# Patient Record
Sex: FEMALE | Race: OTHER | HISPANIC OR LATINO | URBAN - METROPOLITAN AREA
[De-identification: names, ages, dates, MRNs, and addresses within clinical notes are randomized per-mention and may not be internally consistent; named-entity substitution may affect disease eponyms.]

---

## 2020-09-28 ENCOUNTER — EMERGENCY (EMERGENCY)
Facility: HOSPITAL | Age: 30
LOS: 1 days | End: 2020-09-28
Admitting: EMERGENCY MEDICINE
Payer: MEDICAID

## 2020-09-28 PROCEDURE — 76815 OB US LIMITED FETUS(S): CPT | Mod: 26

## 2020-09-28 PROCEDURE — 76817 TRANSVAGINAL US OBSTETRIC: CPT | Mod: 26

## 2020-09-28 PROCEDURE — 99285 EMERGENCY DEPT VISIT HI MDM: CPT

## 2020-10-03 ENCOUNTER — EMERGENCY (EMERGENCY)
Facility: HOSPITAL | Age: 30
LOS: 1 days | End: 2020-10-03
Payer: MEDICAID

## 2020-10-03 PROCEDURE — 76815 OB US LIMITED FETUS(S): CPT | Mod: 26

## 2020-10-03 PROCEDURE — 76817 TRANSVAGINAL US OBSTETRIC: CPT | Mod: 26

## 2020-10-03 PROCEDURE — 99284 EMERGENCY DEPT VISIT MOD MDM: CPT

## 2020-11-05 ENCOUNTER — APPOINTMENT (OUTPATIENT)
Dept: MATERNAL FETAL MEDICINE | Facility: CLINIC | Age: 30
End: 2020-11-05
Payer: MEDICAID

## 2020-11-05 PROCEDURE — 99203 OFFICE O/P NEW LOW 30 MIN: CPT | Mod: TH

## 2020-11-05 PROCEDURE — 76811 OB US DETAILED SNGL FETUS: CPT

## 2020-11-09 PROBLEM — Z00.00 ENCOUNTER FOR PREVENTIVE HEALTH EXAMINATION: Status: ACTIVE | Noted: 2020-11-09

## 2021-01-21 ENCOUNTER — APPOINTMENT (OUTPATIENT)
Dept: ANTEPARTUM | Facility: CLINIC | Age: 31
End: 2021-01-21
Payer: MEDICAID

## 2021-01-21 ENCOUNTER — ASOB RESULT (OUTPATIENT)
Age: 31
End: 2021-01-21

## 2021-01-21 PROCEDURE — 76811 OB US DETAILED SNGL FETUS: CPT

## 2021-01-21 PROCEDURE — 99072 ADDL SUPL MATRL&STAF TM PHE: CPT

## 2021-03-11 ENCOUNTER — ASOB RESULT (OUTPATIENT)
Age: 31
End: 2021-03-11

## 2021-03-11 ENCOUNTER — APPOINTMENT (OUTPATIENT)
Dept: ANTEPARTUM | Facility: CLINIC | Age: 31
End: 2021-03-11
Payer: MEDICAID

## 2021-03-11 PROCEDURE — 76816 OB US FOLLOW-UP PER FETUS: CPT

## 2021-03-11 PROCEDURE — 99072 ADDL SUPL MATRL&STAF TM PHE: CPT

## 2021-04-07 ENCOUNTER — ASOB RESULT (OUTPATIENT)
Age: 31
End: 2021-04-07

## 2021-04-07 ENCOUNTER — APPOINTMENT (OUTPATIENT)
Dept: MATERNAL FETAL MEDICINE | Facility: CLINIC | Age: 31
End: 2021-04-07
Payer: MEDICAID

## 2021-04-07 VITALS — WEIGHT: 162 LBS

## 2021-04-07 DIAGNOSIS — Z86.32 PERSONAL HISTORY OF GESTATIONAL DIABETES: ICD-10-CM

## 2021-04-07 DIAGNOSIS — Z87.59 PERSONAL HISTORY OF OTHER COMPLICATIONS OF PREGNANCY, CHILDBIRTH AND THE PUERPERIUM: ICD-10-CM

## 2021-04-07 DIAGNOSIS — Z83.3 FAMILY HISTORY OF DIABETES MELLITUS: ICD-10-CM

## 2021-04-07 DIAGNOSIS — Z87.51 PERSONAL HISTORY OF PRE-TERM LABOR: ICD-10-CM

## 2021-04-07 DIAGNOSIS — O24.415 GESTATIONAL DIABETES MELLITUS IN PREGNANCY, CONTROLLED BY ORAL HYPOGLYCEMIC DRUGS: ICD-10-CM

## 2021-04-07 PROCEDURE — G0108 DIAB MANAGE TRN  PER INDIV: CPT | Mod: 95

## 2021-04-07 RX ORDER — METFORMIN HYDROCHLORIDE 500 MG/1
500 TABLET, COATED ORAL
Qty: 1 | Refills: 2 | Status: ACTIVE | COMMUNITY
Start: 2021-04-07 | End: 1900-01-01

## 2021-04-20 ENCOUNTER — INPATIENT (INPATIENT)
Facility: HOSPITAL | Age: 31
LOS: 0 days | Discharge: ANOTHER HOSPITAL | End: 2021-04-20
Attending: OBSTETRICS & GYNECOLOGY | Admitting: OBSTETRICS & GYNECOLOGY
Payer: MEDICAID

## 2021-04-20 ENCOUNTER — INPATIENT (INPATIENT)
Facility: HOSPITAL | Age: 31
LOS: 5 days | Discharge: HOME | End: 2021-04-26
Attending: OBSTETRICS & GYNECOLOGY | Admitting: OBSTETRICS & GYNECOLOGY
Payer: COMMERCIAL

## 2021-04-20 VITALS — DIASTOLIC BLOOD PRESSURE: 71 MMHG | SYSTOLIC BLOOD PRESSURE: 145 MMHG | HEART RATE: 109 BPM

## 2021-04-20 DIAGNOSIS — O26.893 OTHER SPECIFIED PREGNANCY RELATED CONDITIONS, THIRD TRIMESTER: ICD-10-CM

## 2021-04-20 LAB
ABO RH CONFIRMATION: SIGNIFICANT CHANGE UP
ALBUMIN SERPL ELPH-MCNC: 3.2 G/DL — LOW (ref 3.3–5.2)
ALBUMIN SERPL ELPH-MCNC: 3.2 G/DL — LOW (ref 3.3–5.2)
ALP SERPL-CCNC: 139 U/L — HIGH (ref 40–120)
ALP SERPL-CCNC: 142 U/L — HIGH (ref 40–120)
ALT FLD-CCNC: 12 U/L — SIGNIFICANT CHANGE UP
ALT FLD-CCNC: 13 U/L — SIGNIFICANT CHANGE UP
ANION GAP SERPL CALC-SCNC: 18 MMOL/L — HIGH (ref 5–17)
ANION GAP SERPL CALC-SCNC: 20 MMOL/L — HIGH (ref 5–17)
APPEARANCE UR: CLEAR — SIGNIFICANT CHANGE UP
APTT BLD: 28.4 SEC — SIGNIFICANT CHANGE UP (ref 27.5–35.5)
AST SERPL-CCNC: 20 U/L — SIGNIFICANT CHANGE UP
AST SERPL-CCNC: 20 U/L — SIGNIFICANT CHANGE UP
BACTERIA # UR AUTO: ABNORMAL
BASOPHILS # BLD AUTO: 0.03 K/UL — SIGNIFICANT CHANGE UP (ref 0–0.2)
BASOPHILS NFR BLD AUTO: 0.2 % — SIGNIFICANT CHANGE UP (ref 0–2)
BILIRUB SERPL-MCNC: 0.2 MG/DL — LOW (ref 0.4–2)
BILIRUB SERPL-MCNC: 0.2 MG/DL — LOW (ref 0.4–2)
BILIRUB UR-MCNC: NEGATIVE — SIGNIFICANT CHANGE UP
BLD GP AB SCN SERPL QL: SIGNIFICANT CHANGE UP
BUN SERPL-MCNC: 8 MG/DL — SIGNIFICANT CHANGE UP (ref 8–20)
BUN SERPL-MCNC: 9 MG/DL — SIGNIFICANT CHANGE UP (ref 8–20)
CALCIUM SERPL-MCNC: 7.9 MG/DL — LOW (ref 8.6–10.2)
CALCIUM SERPL-MCNC: 8 MG/DL — LOW (ref 8.6–10.2)
CHLORIDE SERPL-SCNC: 98 MMOL/L — SIGNIFICANT CHANGE UP (ref 98–107)
CHLORIDE SERPL-SCNC: 98 MMOL/L — SIGNIFICANT CHANGE UP (ref 98–107)
CO2 SERPL-SCNC: 14 MMOL/L — LOW (ref 22–29)
CO2 SERPL-SCNC: 15 MMOL/L — LOW (ref 22–29)
COLOR SPEC: YELLOW — SIGNIFICANT CHANGE UP
CREAT ?TM UR-MCNC: 47 MG/DL — SIGNIFICANT CHANGE UP
CREAT SERPL-MCNC: 0.45 MG/DL — LOW (ref 0.5–1.3)
CREAT SERPL-MCNC: 0.49 MG/DL — LOW (ref 0.5–1.3)
DIFF PNL FLD: ABNORMAL
EOSINOPHIL # BLD AUTO: 0.01 K/UL — SIGNIFICANT CHANGE UP (ref 0–0.5)
EOSINOPHIL NFR BLD AUTO: 0.1 % — SIGNIFICANT CHANGE UP (ref 0–6)
EPI CELLS # UR: SIGNIFICANT CHANGE UP
FIBRINOGEN PPP-MCNC: 1012 MG/DL — CRITICAL HIGH (ref 290–520)
FIBRONECTIN FETAL SPEC QL: POSITIVE
GLUCOSE BLDC GLUCOMTR-MCNC: 103 MG/DL — HIGH (ref 70–99)
GLUCOSE SERPL-MCNC: 107 MG/DL — HIGH (ref 70–99)
GLUCOSE SERPL-MCNC: 119 MG/DL — HIGH (ref 70–99)
GLUCOSE UR QL: NEGATIVE MG/DL — SIGNIFICANT CHANGE UP
HCT VFR BLD CALC: 39 % — SIGNIFICANT CHANGE UP (ref 34.5–45)
HCT VFR BLD CALC: 39.9 % — SIGNIFICANT CHANGE UP (ref 34.5–45)
HGB BLD-MCNC: 13.1 G/DL — SIGNIFICANT CHANGE UP (ref 11.5–15.5)
HGB BLD-MCNC: 13.5 G/DL — SIGNIFICANT CHANGE UP (ref 11.5–15.5)
HIV 1 & 2 AB SERPL IA.RAPID: SIGNIFICANT CHANGE UP
IMM GRANULOCYTES NFR BLD AUTO: 1.8 % — HIGH (ref 0–1.5)
INR BLD: 0.95 RATIO — SIGNIFICANT CHANGE UP (ref 0.88–1.16)
KETONES UR-MCNC: ABNORMAL
LDH SERPL L TO P-CCNC: 213 U/L — HIGH (ref 98–192)
LEUKOCYTE ESTERASE UR-ACNC: NEGATIVE — SIGNIFICANT CHANGE UP
LYMPHOCYTES # BLD AUTO: 1.03 K/UL — SIGNIFICANT CHANGE UP (ref 1–3.3)
LYMPHOCYTES # BLD AUTO: 8 % — LOW (ref 13–44)
MAGNESIUM SERPL-MCNC: 4.8 MG/DL — HIGH (ref 1.6–2.6)
MAGNESIUM SERPL-MCNC: 5 MG/DL — HIGH (ref 1.6–2.6)
MCHC RBC-ENTMCNC: 30.1 PG — SIGNIFICANT CHANGE UP (ref 27–34)
MCHC RBC-ENTMCNC: 30.1 PG — SIGNIFICANT CHANGE UP (ref 27–34)
MCHC RBC-ENTMCNC: 33.6 GM/DL — SIGNIFICANT CHANGE UP (ref 32–36)
MCHC RBC-ENTMCNC: 33.8 GM/DL — SIGNIFICANT CHANGE UP (ref 32–36)
MCV RBC AUTO: 89.1 FL — SIGNIFICANT CHANGE UP (ref 80–100)
MCV RBC AUTO: 89.7 FL — SIGNIFICANT CHANGE UP (ref 80–100)
MONOCYTES # BLD AUTO: 0.12 K/UL — SIGNIFICANT CHANGE UP (ref 0–0.9)
MONOCYTES NFR BLD AUTO: 0.9 % — LOW (ref 2–14)
NEUTROPHILS # BLD AUTO: 11.46 K/UL — HIGH (ref 1.8–7.4)
NEUTROPHILS NFR BLD AUTO: 89 % — HIGH (ref 43–77)
NITRITE UR-MCNC: NEGATIVE — SIGNIFICANT CHANGE UP
PH UR: 6 — SIGNIFICANT CHANGE UP (ref 5–8)
PLATELET # BLD AUTO: 237 K/UL — SIGNIFICANT CHANGE UP (ref 150–400)
PLATELET # BLD AUTO: 244 K/UL — SIGNIFICANT CHANGE UP (ref 150–400)
POTASSIUM SERPL-MCNC: 4.2 MMOL/L — SIGNIFICANT CHANGE UP (ref 3.5–5.3)
POTASSIUM SERPL-MCNC: 4.4 MMOL/L — SIGNIFICANT CHANGE UP (ref 3.5–5.3)
POTASSIUM SERPL-SCNC: 4.2 MMOL/L — SIGNIFICANT CHANGE UP (ref 3.5–5.3)
POTASSIUM SERPL-SCNC: 4.4 MMOL/L — SIGNIFICANT CHANGE UP (ref 3.5–5.3)
PROT ?TM UR-MCNC: 15 MG/DL — HIGH (ref 0–12)
PROT SERPL-MCNC: 6.7 G/DL — SIGNIFICANT CHANGE UP (ref 6.6–8.7)
PROT SERPL-MCNC: 6.8 G/DL — SIGNIFICANT CHANGE UP (ref 6.6–8.7)
PROT UR-MCNC: NEGATIVE MG/DL — SIGNIFICANT CHANGE UP
PROT/CREAT UR-RTO: 0.3 RATIO — HIGH
PROTHROM AB SERPL-ACNC: 11 SEC — SIGNIFICANT CHANGE UP (ref 10.6–13.6)
RBC # BLD: 4.35 M/UL — SIGNIFICANT CHANGE UP (ref 3.8–5.2)
RBC # BLD: 4.48 M/UL — SIGNIFICANT CHANGE UP (ref 3.8–5.2)
RBC # FLD: 13 % — SIGNIFICANT CHANGE UP (ref 10.3–14.5)
RBC # FLD: 13.2 % — SIGNIFICANT CHANGE UP (ref 10.3–14.5)
RBC CASTS # UR COMP ASSIST: ABNORMAL /HPF (ref 0–4)
SODIUM SERPL-SCNC: 131 MMOL/L — LOW (ref 135–145)
SODIUM SERPL-SCNC: 132 MMOL/L — LOW (ref 135–145)
SP GR SPEC: 1.02 — SIGNIFICANT CHANGE UP (ref 1.01–1.02)
URATE SERPL-MCNC: 6.4 MG/DL — HIGH (ref 2.4–5.7)
UROBILINOGEN FLD QL: NEGATIVE MG/DL — SIGNIFICANT CHANGE UP
WBC # BLD: 12.74 K/UL — HIGH (ref 3.8–10.5)
WBC # BLD: 12.88 K/UL — HIGH (ref 3.8–10.5)
WBC # FLD AUTO: 12.74 K/UL — HIGH (ref 3.8–10.5)
WBC # FLD AUTO: 12.88 K/UL — HIGH (ref 3.8–10.5)
WBC UR QL: SIGNIFICANT CHANGE UP

## 2021-04-20 PROCEDURE — 76820 UMBILICAL ARTERY ECHO: CPT | Mod: 26

## 2021-04-20 PROCEDURE — L9996: CPT

## 2021-04-20 PROCEDURE — 76815 OB US LIMITED FETUS(S): CPT | Mod: 26

## 2021-04-20 PROCEDURE — 70551 MRI BRAIN STEM W/O DYE: CPT | Mod: 26

## 2021-04-20 PROCEDURE — 76819 FETAL BIOPHYS PROFIL W/O NST: CPT | Mod: 26

## 2021-04-20 PROCEDURE — 99213 OFFICE O/P EST LOW 20 MIN: CPT

## 2021-04-20 PROCEDURE — 99221 1ST HOSP IP/OBS SF/LOW 40: CPT

## 2021-04-20 PROCEDURE — 76816 OB US FOLLOW-UP PER FETUS: CPT | Mod: 26

## 2021-04-20 PROCEDURE — 70544 MR ANGIOGRAPHY HEAD W/O DYE: CPT | Mod: 26,59

## 2021-04-20 PROCEDURE — 76818 FETAL BIOPHYS PROFILE W/NST: CPT | Mod: 26

## 2021-04-20 PROCEDURE — 93976 VASCULAR STUDY: CPT | Mod: 26

## 2021-04-20 RX ORDER — FOLIC ACID 0.8 MG
1 TABLET ORAL DAILY
Refills: 0 | Status: DISCONTINUED | OUTPATIENT
Start: 2021-04-20 | End: 2021-04-22

## 2021-04-20 RX ORDER — MAGNESIUM SULFATE 500 MG/ML
2 VIAL (ML) INJECTION
Qty: 40 | Refills: 0 | Status: DISCONTINUED | OUTPATIENT
Start: 2021-04-20 | End: 2021-04-26

## 2021-04-20 RX ORDER — PROCHLORPERAZINE MALEATE 5 MG
10 TABLET ORAL ONCE
Refills: 0 | Status: COMPLETED | OUTPATIENT
Start: 2021-04-20 | End: 2021-04-20

## 2021-04-20 RX ORDER — ACETAMINOPHEN 500 MG
1000 TABLET ORAL ONCE
Refills: 0 | Status: COMPLETED | OUTPATIENT
Start: 2021-04-20 | End: 2021-04-20

## 2021-04-20 RX ORDER — DEXTROSE 50 % IN WATER 50 %
15 SYRINGE (ML) INTRAVENOUS ONCE
Refills: 0 | Status: DISCONTINUED | OUTPATIENT
Start: 2021-04-20 | End: 2021-04-23

## 2021-04-20 RX ORDER — INSULIN LISPRO 100/ML
VIAL (ML) SUBCUTANEOUS
Refills: 0 | Status: DISCONTINUED | OUTPATIENT
Start: 2021-04-20 | End: 2021-04-21

## 2021-04-20 RX ORDER — GLUCAGON INJECTION, SOLUTION 0.5 MG/.1ML
1 INJECTION, SOLUTION SUBCUTANEOUS ONCE
Refills: 0 | Status: DISCONTINUED | OUTPATIENT
Start: 2021-04-20 | End: 2021-04-23

## 2021-04-20 RX ORDER — DIPHENHYDRAMINE HCL 50 MG
50 CAPSULE ORAL ONCE
Refills: 0 | Status: COMPLETED | OUTPATIENT
Start: 2021-04-20 | End: 2021-04-20

## 2021-04-20 RX ORDER — SODIUM CHLORIDE 9 MG/ML
1000 INJECTION, SOLUTION INTRAVENOUS
Refills: 0 | Status: DISCONTINUED | OUTPATIENT
Start: 2021-04-20 | End: 2021-04-23

## 2021-04-20 RX ORDER — SODIUM CHLORIDE 9 MG/ML
1000 INJECTION, SOLUTION INTRAVENOUS
Refills: 0 | Status: DISCONTINUED | OUTPATIENT
Start: 2021-04-20 | End: 2021-04-21

## 2021-04-20 RX ORDER — DEXTROSE 50 % IN WATER 50 %
25 SYRINGE (ML) INTRAVENOUS ONCE
Refills: 0 | Status: DISCONTINUED | OUTPATIENT
Start: 2021-04-20 | End: 2021-04-23

## 2021-04-20 RX ORDER — DEXTROSE 50 % IN WATER 50 %
12.5 SYRINGE (ML) INTRAVENOUS ONCE
Refills: 0 | Status: DISCONTINUED | OUTPATIENT
Start: 2021-04-20 | End: 2021-04-23

## 2021-04-20 RX ORDER — METOCLOPRAMIDE HCL 10 MG
10 TABLET ORAL ONCE
Refills: 0 | Status: COMPLETED | OUTPATIENT
Start: 2021-04-20 | End: 2021-04-20

## 2021-04-20 RX ORDER — SODIUM CHLORIDE 9 MG/ML
1000 INJECTION, SOLUTION INTRAVENOUS
Refills: 0 | Status: COMPLETED | OUTPATIENT
Start: 2021-04-20 | End: 2021-04-20

## 2021-04-20 RX ADMIN — Medication 400 MILLIGRAM(S): at 15:55

## 2021-04-20 RX ADMIN — Medication 10 MILLIGRAM(S): at 20:42

## 2021-04-20 RX ADMIN — SODIUM CHLORIDE 75 MILLILITER(S): 9 INJECTION, SOLUTION INTRAVENOUS at 20:41

## 2021-04-20 RX ADMIN — Medication 1000 MILLIGRAM(S): at 16:10

## 2021-04-20 RX ADMIN — Medication 50 MILLIGRAM(S): at 20:49

## 2021-04-20 RX ADMIN — Medication 50 GM/HR: at 15:44

## 2021-04-20 RX ADMIN — SODIUM CHLORIDE 75 MILLILITER(S): 9 INJECTION, SOLUTION INTRAVENOUS at 15:44

## 2021-04-20 NOTE — OB RN PATIENT PROFILE - NS_OBGYNHISTORY_OBGYN_ALL_OB_FT
as above    history of blood transfusion with first delivery - patient experienced fever.   GDMA2 on metformin

## 2021-04-20 NOTE — CONSULT NOTE ADULT - ATTENDING COMMENTS
MFM - [place] and [place] [facility] [place]  IUP at 33.1 weeks with history of indicated   delivery x 2 complicated by preeclampsia presents for inpatient management of preeclampsia with severe features.  Pregnancy also complicated by A2GDM on Metformin. Patient reports onset on headache yesterday and [place] [facility] [place] BP treated with IV labetalol 20 / 40.  Magnesium sulfate therapy started and patient received beta#1.  FHRT reassuring and patient stable for transfer to higher level of NICU care.  Upon arrival, patient continues to report severe headache note relieved by Tylenol.  She also reports generalized paraesthesia and left sided facial pain.  BP stable.  No significant findings on neurologic exam.  Repeat labs pending.  FHRT Category I with ctx q 2-5 minutes.  Continue MGSO4 and trend BP.  NPO with accuchecks q4 and ISS.  GIven significant neuro findings and headache, will obtain MRI/MRV/MRA to rule out intracranial pathology.  IV Tylenol and Fioricet for headache.  Plan for inpatient admission until delivery at 34 weeks.  Earlier delivery may be necessary if no improvement in headache, persistent severe range BP or change in maternal / fetal status.  Additionally, if no resolution of contractions or evidence of labor, will proceed with  delivery given history of prior uterine surgery.  Patient counseled regarding plan and consented for MRI/MRV/MRA.   Sade Batista MD  Maternal Fetal Medicine

## 2021-04-20 NOTE — OB PROVIDER H&P - NSHPPHYSICALEXAM_GEN_ALL_CORE
Vital Signs Last 24 Hrs  T(C): 36.8 (20 Apr 2021 16:55), Max: 36.8 (20 Apr 2021 16:55)  T(F): 98.24 (20 Apr 2021 16:55), Max: 98.24 (20 Apr 2021 16:55)  HR: 113 (20 Apr 2021 17:00) (108 - 115)  BP: 136/76 (20 Apr 2021 16:55) (130/73 - 152/80)  RR: 36 (20 Apr 2021 16:55) (36 - 36)  SpO2: 96% (20 Apr 2021 17:00) (94% - 96%)    Gen: NAD, alert and oriented x3   Neuro: pain with ROM of eyes to left side, otherwise follows finger with smooth motion and no nystagmus, PERRLA, symmetry noted with smiling, sticking out tongue, and raising eyebrows, adequate and symmetric muscular resistance to forced opening of eyes  Cardio: RRR  Lungs: CTAB   Abdomen: gravid, nontender to palpation  Ext: nontender lower extremities bilaterally, +2 brachioradialis DTR's bilaterally, +clonus noted on pedal flexion, moderate edema in lower extremities     FHR: baseline 130, moderate variability, +10x10 accels, no decels  [place]: al irregulalry q2-5m apart

## 2021-04-20 NOTE — OB PROVIDER H&P - ASSESSMENT
31 y/o  at 33w1d (EDC 21) admitted for pre-eclampsia with severe features based on elevated blood pressures requiring IV antihypertensive medication administration x1 and neurological symptoms.     1. Pre-eclampsia with severe features   - PIH labs drawn for baseline here   - MgSO4 started at 10am, will continue with Mg serum level checks for therapeutic goal and Mg toxicity checks   - BP's well controlled at this time, will continue to monitor and medicate as needed, no standing medications at this time   - Beta1 at 11am, Beta2 pending for tomorrow   - Neuro symptoms consistent with headache and visual disturbances, work up and intervention below     2. Neurological Deficits   - face and extremity neuro exam without major abnormalities, tone/ROM/fine movements intact, ocular exam without gross deficits except pain with left ROM   - 10/10 headache, given Tylenol and Fioricet, will re-evaluate for improvement   - MRI/MRA/MRV Head pending   - paresthesias of left half of face and fingertips/toes, will control BP and serum glucose, will follow neuro evaluation   - will discuss neurology consult pending evaluation results     3. GDM   - formerly on Metformin 500 BID, will discontinue and start on ISS   - FSG 7xdaily   - HgbA1C elevated in outpatient setting   - glucose control + intervention as needed     4. 33w gestation   - risk of  delivery, currently getting ACS, next dose tomorrow 11am   - continuous FHT/[place] monitoring, will observe for resolution of  contractions   - [place] [facility] [place] , EFW 2019g, vertex presentation, grossly normal placental evaluation,  BPP, FARHAN 10cm+  - Will discuss FFN + CL if  contractions persist   - GBS to be collected, as well as 3rd trimester labs, COVID outpatient NEG and asymptomatic     d/w Dr. Morris ([facility] OB Hospital[facility] On-Call)   d/w Dr. Batista ([facility] On-Call)

## 2021-04-20 NOTE — CONSULT NOTE ADULT - SUBJECTIVE AND OBJECTIVE BOX
31 y/o  at 33w1d (EDC 21), by first trimester sono) transfer from [facility] [place] for pre-eclampsia with severe features.   She reports yesterday she started experiencing visual disturbances that started off as start and blurry vision and then progressed to double vision and tunnel vision today. She reports almost tripping/falling today because of trouble seeing. She reports today she developed a severe 10/10 headache today that came out of nowhere. She says it is mostly left sided with throbbing sensation in her left ear, parasthesia of the left side of her face. She also reports parasthesia and numbness in her fingertips and toes, equally bilateral. Reports fast paced breathing because of pain and anxiety but no SOB or difficulty with deep inspiration, no epigastric/RUQ pain.   She presented today to [facility] [place] where she was given antihypertensive IV medication x1, Betamethasone first shot given, and started on MgSO4 infusion therapy for neuro seizure prophylaxis.   Denies photophobia, lacrimation, nausea/vomiting, or uneven visual disturbances comparatively between both eyes. She denies fevers, chills, diarrhea/constipation, hx of migraines, neurological past medical history, or family history of stroke, blood clots, seizure activity, or brain anatomical abnormalities. She reports never actually falling or losing consciousness during the last 2d.   She reports history of these symptoms during the last two pregnancies, the visual disturbances now are very similar to previous pregnancies and while she admits that she also had headaches during the previous pregnancies complicated by pre-eclampsia, it was never this severe before. She denies any visual or headache symptoms out of pregnancies. Denies fam hx of HTN or DM.   Denies any vaginal bleeding, leakage of fluid, and lower abdominal pain/cramping. +     PNC at [facility]   Prenatal course significant for:   1. Hx of pre-eclamspia with both previous pregnancies resulting in urgent and premature  deliveries    2. GDMA2, currently on Metformin   3. Hx of PPH with last pregnancies, requiring blood transfusion post partum     ObHx:   G1: , F, 36w, 5.5lbs, pCS, complicated by pre-eclampsia and PPH   G2: , M, 36w, 6.5lbs, rCS, complicated by pre-eclampsia and PPH   GynHx: denies history of abnormal pap smears, fibroids, endometriosis, or ovarian cysts; denies history of STD's   PMH/PSH: denies   Meds: PNV, Metformin 500 BID   Allergies: NKDA   SocHx: denies use of EtOH, illicit trugs, or tobacco during this pregnancy or prior; denies any hx of anxiety or depression; feels safe at home     Vital Signs Last 24 Hrs  T(C): 36.8 (2021 16:55), Max: 36.8 (2021 16:55)  T(F): 98.24 (2021 16:55), Max: 98.24 (2021 16:55)  HR: 113 (2021 17:00) (108 - 115)  BP: 136/76 (2021 16:55) (130/73 - 152/80)  RR: 36 (2021 16:55) (36 - 36)  SpO2: 96% (2021 17:00) (94% - 96%)    Gen: NAD, alert and oriented x3   Neuro: pain with ROM of eyes to left side, otherwise follows finger with smooth motion and no nystagmus, PERRLA, symmetry noted with smiling, sticking out tongue, and raising eyebrows, adequate and symmetric muscular resistance to forced opening of eyes  Cardio: RRR  Lungs: CTAB   Abdomen: gravid, nontender to palpation  Ext: nontender lower extremities bilaterally, +2 brachioradialis DTR's bilaterally, +clonus noted on pedal flexion, moderate edema in lower extremities     FHR: baseline 130, moderate variability, +10x10 accels, no decels  [place]: al irregulalry q2-5m apart

## 2021-04-20 NOTE — CHART NOTE - NSCHARTNOTEFT_GEN_A_CORE
Patient returns from MRI. See results.   Still complaining of same headache as before and left eye "cloudiness."   Discussed case with Dr. 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  Will attempt to give Reglan 10mg IV and Fioricet.   If headache continues, will consider Morphine 5mg IV.   If headache still does not resolve, will consider rCS with BS.     d/w Dr. Avina Patient returns from MRI. See results.   Still complaining of same headache as before and left eye "cloudiness."   Discussed case with Dr. 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  Will attempt to give Reglan 10mg IV and Fioricet.   If headache continues, will consider Morphine 5mg IV.   If headache still does not resolve, will consider rCS with BS for pre-e with SF.     d/w Dr. Avina Patient returns from MRI. See results.   Still complaining of same headache as before and left eye "cloudiness."   Discussed case with Dr. 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  Will attempt to give Reglan 10mg IV and Fioricet.   If headache continues, will consider Morphine 5mg IV.   If headache still does not resolve, will consider rCS with BS for pre-e with SF.     d/w Dr. Avina    attending addendum  I discussed finding with patient, she fell asleep multiple times through our conversation. Will repeat blood work and give reglan.   Will reassess in 4hrs or prn and if HA is not relieved will give fioricet and continue with plan as above  ppalos

## 2021-04-20 NOTE — OB PROVIDER H&P - ATTENDING COMMENTS
agree with assessment and plan   [place] [facility] [place]   headache not responding to pain management given  pre eclampsia work up significant for Prot : Cr ratio of 0.3  on exam no S/S of raised intracranial pressure  gross neuro exam normal   only complaint pt has of paresthesia  on Mag for seizure ppx  UP adequate   FS wnl , will be on insulin SS  dvt PPX with venodynes  Contractions seen on Monitor , pt reports contx q 10 min, FFN collected  abd soft, , uterus soft, NT  ext clonus++, mild edema   cat 1 tracing   BP wnl, no antihypertensive needed  plan   brain imaging   follow [facility] recommendation for delivery if headache not improving after r/o intracranial eitiology

## 2021-04-20 NOTE — OB PROVIDER H&P - HISTORY OF PRESENT ILLNESS
29 y/o  at 33w1d (EDC 21), by first trimester sono) transfer from [facility] [place] for pre-eclampsia with severe features.   She reports yesterday she started experiencing visual disturbances that started off as start and blurry vision and then progressed to double vision and tunnel vision today. She reports almost tripping/falling today because of trouble seeing. She reports today she developed a severe 10/10 headache today that came out of nowhere. She says it is mostly left sided with throbbing sensation in her left ear, parasthesia of the left side of her face. She also reports parasthesia and numbness in her fingertips and toes, equally bilateral. Reports fast paced breathing because of pain and anxiety but no SOB or difficulty with deep inspiration, no epigastric/RUQ pain.   She presented today to [facility] [place] where she was given antihypertensive IV medication x1, Betamethasone first shot given, and started on MgSO4 infusion therapy for neuro seizure prophylaxis.   Denies photophobia, lacrimation, nausea/vomiting, or uneven visual disturbances comparatively between both eyes. She denies fevers, chills, diarrhea/constipation, hx of migraines, neurological past medical history, or family history of stroke, blood clots, seizure activity, or brain anatomical abnormalities. She reports never actually falling or losing consciousness during the last 2d.   She reports history of these symptoms during the last two pregnancies, the visual disturbances now are very similar to previous pregnancies and while she admits that she also had headaches during the previous pregnancies complicated by pre-eclampsia, it was never this severe before. She denies any visual or headache symptoms out of pregnancies. Denies fam hx of HTN or DM.   Denies any vaginal bleeding, leakage of fluid, and lower abdominal pain/cramping. +     PNC at [facility]   Prenatal course significant for:   1. Hx of pre-eclamspia with both previous pregnancies resulting in urgent and premature  deliveries    2. GDMA2, currently on Metformin   3. Hx of PPH with last pregnancies, requiring blood transfusion post partum     ObHx:   G1: , F, 36w, 5.5lbs, pCS, complicated by pre-eclampsia and PPH   G2: , M, 36w, 6.5lbs, rCS, complicated by pre-eclampsia and PPH   GynHx: denies history of abnormal pap smears, fibroids, endometriosis, or ovarian cysts; denies history of STD's   PMH/PSH: denies   Meds: PNV, Metformin 500 BID   Allergies: NKDA   SocHx: denies use of EtOH, illicit trugs, or tobacco during this pregnancy or prior; denies any hx of anxiety or depression; feels safe at home

## 2021-04-20 NOTE — CHART NOTE - NSCHARTNOTEFT_GEN_A_CORE
Patient reports feeling like urinating with each contraction. She complaints of a constant headache.   SVE 0/0/-2  125 baseline, moderate variability, + accels, - decels  [place]: q1.5-2 min    MRI (brain) results:  - no acute infarct  - on MRA no inclusion  - on MRV, no signal in the left and transverse sigmoid sinuses, likely due to hypoplasia vs thrombosed  - chronic sinus disease    Discussed with Dr. Avina

## 2021-04-21 ENCOUNTER — APPOINTMENT (OUTPATIENT)
Dept: ANTEPARTUM | Facility: CLINIC | Age: 31
End: 2021-04-21

## 2021-04-21 ENCOUNTER — APPOINTMENT (OUTPATIENT)
Dept: MATERNAL FETAL MEDICINE | Facility: CLINIC | Age: 31
End: 2021-04-21

## 2021-04-21 LAB
ALBUMIN SERPL ELPH-MCNC: 3.1 G/DL — LOW (ref 3.3–5.2)
ALP SERPL-CCNC: 134 U/L — HIGH (ref 40–120)
ALT FLD-CCNC: 10 U/L — SIGNIFICANT CHANGE UP
ANION GAP SERPL CALC-SCNC: 21 MMOL/L — HIGH (ref 5–17)
APTT BLD: 44.9 SEC — HIGH (ref 27.5–35.5)
AST SERPL-CCNC: 14 U/L — SIGNIFICANT CHANGE UP
BILIRUB SERPL-MCNC: <0.2 MG/DL — LOW (ref 0.4–2)
BUN SERPL-MCNC: 10 MG/DL — SIGNIFICANT CHANGE UP (ref 8–20)
CALCIUM SERPL-MCNC: 7.4 MG/DL — LOW (ref 8.6–10.2)
CHLORIDE SERPL-SCNC: 99 MMOL/L — SIGNIFICANT CHANGE UP (ref 98–107)
CO2 SERPL-SCNC: 13 MMOL/L — LOW (ref 22–29)
COVID-19 SPIKE DOMAIN AB INTERP: POSITIVE
COVID-19 SPIKE DOMAIN ANTIBODY RESULT: >250 U/ML — HIGH
CREAT SERPL-MCNC: 0.48 MG/DL — LOW (ref 0.5–1.3)
GLUCOSE BLDC GLUCOMTR-MCNC: 110 MG/DL — HIGH (ref 70–99)
GLUCOSE BLDC GLUCOMTR-MCNC: 113 MG/DL — HIGH (ref 70–99)
GLUCOSE BLDC GLUCOMTR-MCNC: 124 MG/DL — HIGH (ref 70–99)
GLUCOSE BLDC GLUCOMTR-MCNC: 162 MG/DL — HIGH (ref 70–99)
GLUCOSE SERPL-MCNC: 114 MG/DL — HIGH (ref 70–99)
HCT VFR BLD CALC: 34.8 % — SIGNIFICANT CHANGE UP (ref 34.5–45)
HCT VFR BLD CALC: 38.6 % — SIGNIFICANT CHANGE UP (ref 34.5–45)
HGB BLD-MCNC: 11.3 G/DL — LOW (ref 11.5–15.5)
HGB BLD-MCNC: 12.5 G/DL — SIGNIFICANT CHANGE UP (ref 11.5–15.5)
HIV 1+2 AB+HIV1 P24 AG SERPL QL IA: SIGNIFICANT CHANGE UP
MAGNESIUM SERPL-MCNC: 6.3 MG/DL — HIGH (ref 1.6–2.6)
MCHC RBC-ENTMCNC: 29.6 PG — SIGNIFICANT CHANGE UP (ref 27–34)
MCHC RBC-ENTMCNC: 29.7 PG — SIGNIFICANT CHANGE UP (ref 27–34)
MCHC RBC-ENTMCNC: 32.4 GM/DL — SIGNIFICANT CHANGE UP (ref 32–36)
MCHC RBC-ENTMCNC: 32.5 GM/DL — SIGNIFICANT CHANGE UP (ref 32–36)
MCV RBC AUTO: 91.3 FL — SIGNIFICANT CHANGE UP (ref 80–100)
MCV RBC AUTO: 91.6 FL — SIGNIFICANT CHANGE UP (ref 80–100)
MEV IGG SER-ACNC: 41.3 AU/ML — SIGNIFICANT CHANGE UP
MEV IGG+IGM SER-IMP: POSITIVE — SIGNIFICANT CHANGE UP
PLATELET # BLD AUTO: 223 K/UL — SIGNIFICANT CHANGE UP (ref 150–400)
PLATELET # BLD AUTO: 254 K/UL — SIGNIFICANT CHANGE UP (ref 150–400)
POTASSIUM SERPL-MCNC: 4.2 MMOL/L — SIGNIFICANT CHANGE UP (ref 3.5–5.3)
POTASSIUM SERPL-SCNC: 4.2 MMOL/L — SIGNIFICANT CHANGE UP (ref 3.5–5.3)
PROT SERPL-MCNC: 6.6 G/DL — SIGNIFICANT CHANGE UP (ref 6.6–8.7)
RBC # BLD: 3.8 M/UL — SIGNIFICANT CHANGE UP (ref 3.8–5.2)
RBC # BLD: 4.23 M/UL — SIGNIFICANT CHANGE UP (ref 3.8–5.2)
RBC # FLD: 13.2 % — SIGNIFICANT CHANGE UP (ref 10.3–14.5)
RBC # FLD: 13.3 % — SIGNIFICANT CHANGE UP (ref 10.3–14.5)
SARS-COV-2 IGG+IGM SERPL QL IA: >250 U/ML — HIGH
SARS-COV-2 IGG+IGM SERPL QL IA: POSITIVE
SODIUM SERPL-SCNC: 133 MMOL/L — LOW (ref 135–145)
T PALLIDUM AB TITR SER: NEGATIVE — SIGNIFICANT CHANGE UP
WBC # BLD: 10.87 K/UL — HIGH (ref 3.8–10.5)
WBC # BLD: 14.78 K/UL — HIGH (ref 3.8–10.5)
WBC # FLD AUTO: 10.87 K/UL — HIGH (ref 3.8–10.5)
WBC # FLD AUTO: 14.78 K/UL — HIGH (ref 3.8–10.5)

## 2021-04-21 PROCEDURE — 99232 SBSQ HOSP IP/OBS MODERATE 35: CPT | Mod: GC

## 2021-04-21 PROCEDURE — 99223 1ST HOSP IP/OBS HIGH 75: CPT

## 2021-04-21 PROCEDURE — 93010 ELECTROCARDIOGRAM REPORT: CPT

## 2021-04-21 RX ORDER — ACETAMINOPHEN WITH CODEINE 300MG-30MG
2 TABLET ORAL EVERY 6 HOURS
Refills: 0 | Status: DISCONTINUED | OUTPATIENT
Start: 2021-04-21 | End: 2021-04-22

## 2021-04-21 RX ORDER — INSULIN LISPRO 100/ML
VIAL (ML) SUBCUTANEOUS
Refills: 0 | Status: DISCONTINUED | OUTPATIENT
Start: 2021-04-21 | End: 2021-04-21

## 2021-04-21 RX ORDER — HEPARIN SODIUM 5000 [USP'U]/ML
3000 INJECTION INTRAVENOUS; SUBCUTANEOUS EVERY 6 HOURS
Refills: 0 | Status: DISCONTINUED | OUTPATIENT
Start: 2021-04-21 | End: 2021-04-23

## 2021-04-21 RX ORDER — INSULIN LISPRO 100/ML
VIAL (ML) SUBCUTANEOUS
Refills: 0 | Status: DISCONTINUED | OUTPATIENT
Start: 2021-04-21 | End: 2021-04-23

## 2021-04-21 RX ORDER — ACETAMINOPHEN 500 MG
975 TABLET ORAL ONCE
Refills: 0 | Status: COMPLETED | OUTPATIENT
Start: 2021-04-21 | End: 2021-04-21

## 2021-04-21 RX ORDER — HEPARIN SODIUM 5000 [USP'U]/ML
6000 INJECTION INTRAVENOUS; SUBCUTANEOUS EVERY 6 HOURS
Refills: 0 | Status: DISCONTINUED | OUTPATIENT
Start: 2021-04-21 | End: 2021-04-23

## 2021-04-21 RX ORDER — HEPARIN SODIUM 5000 [USP'U]/ML
INJECTION INTRAVENOUS; SUBCUTANEOUS
Qty: 25000 | Refills: 0 | Status: DISCONTINUED | OUTPATIENT
Start: 2021-04-21 | End: 2021-04-23

## 2021-04-21 RX ADMIN — HEPARIN SODIUM 1500 UNIT(S)/HR: 5000 INJECTION INTRAVENOUS; SUBCUTANEOUS at 22:08

## 2021-04-21 RX ADMIN — Medication 4: at 16:22

## 2021-04-21 RX ADMIN — HEPARIN SODIUM 3000 UNIT(S): 5000 INJECTION INTRAVENOUS; SUBCUTANEOUS at 22:57

## 2021-04-21 RX ADMIN — Medication 2 TABLET(S): at 10:25

## 2021-04-21 RX ADMIN — Medication 2 TABLET(S): at 09:25

## 2021-04-21 RX ADMIN — Medication 975 MILLIGRAM(S): at 22:02

## 2021-04-21 RX ADMIN — Medication 1 TABLET(S): at 14:00

## 2021-04-21 RX ADMIN — HEPARIN SODIUM 1300 UNIT(S)/HR: 5000 INJECTION INTRAVENOUS; SUBCUTANEOUS at 15:15

## 2021-04-21 RX ADMIN — Medication 12 MILLIGRAM(S): at 10:35

## 2021-04-21 RX ADMIN — SODIUM CHLORIDE 75 MILLILITER(S): 9 INJECTION, SOLUTION INTRAVENOUS at 22:03

## 2021-04-21 RX ADMIN — Medication 50 GM/HR: at 06:01

## 2021-04-21 RX ADMIN — Medication 1 MILLIGRAM(S): at 15:54

## 2021-04-21 RX ADMIN — Medication 975 MILLIGRAM(S): at 22:32

## 2021-04-21 NOTE — CONSULT NOTE ADULT - ASSESSMENT
31 y/o  at 33w1d (EDC 21) admitted for pre-eclampsia with severe features based on elevated blood pressures requiring IV antihypertensive medication administration x1 and neurological symptoms.     1. Pre-eclampsia with severe features   - PIH labs drawn for baseline here   - MgSO4 started at 10am, will continue with Mg serum level checks for therapeutic goal and Mg toxicity checks   - BP's well controlled at this time, will continue to monitor and medicate as needed, no standing medications at this time   - Beta1 at 11am, Beta2 pending for tomorrow   - Neuro symptoms consistent with headache and visual disturbances, work up and intervention below     2. Neurological Deficits   - face and extremity neuro exam without major abnormalities, tone/ROM/fine movements intact, ocular exam without gross deficits except pain with left ROM   - 10/10 headache, given Tylenol and Fioricet, will re-evaluate for improvement   - MRI/MRA/MRV Head pending   - paresthesias of left half of face and fingertips/toes, will control BP and serum glucose, will follow neuro evaluation   - will discuss neurology consult pending evaluation results     3. GDM   - formerly on Metformin 500 BID, will discontinue and start on ISS   - FSG 7xdaily   - HgbA1C elevated in outpatient setting   - glucose control + intervention as needed     4. Hx of PPH   - baseline H/H 12.4/36.6  - CBC pending   - if/when proceeds to delivery, will prepare for PPH, will discuss TXA/pRBC on hold/in OR    5. 33w gestation   - risk of  delivery, currently getting ACS, next dose tomorrow 11am   - continuous FHT/[place] monitoring, will observe for resolution of  contractions   - [place] [facility] [place] , EFW 2019g, vertex presentation, grossly normal placental evaluation,  BPP, FARHAN 10cm+  - Will discuss FFN + CL if  contractions persist   - GBS to be collected, as well as 3rd trimester labs, COVID outpatient NEG and asymptomatic     d/w Dr. Morris ([facility] OB Hospitalist On-Call)   d/w Dr. Batista ([facility] On-Call)   
The patient is a 30y Female who is followed by neurology because of headache possible dural sinus thrombosis    Her headache has improved with medication  She no longer has paresthesia  She may have had migraine or sinus headache    It is difficult to say if this is sinus thrombosis  The use of IV contrast (MRI or CT) would be helpful to distinguish between hypoplasia and thrombosis  If it is safe for the mother and baby I would suggest that either an MRV ot CTV with contrast be performed  However, if either of these are not safe while the patient is pregnant I would consider anticoagulation emprically if that would be safe for both mother and baby.  If anticoagulation would pose a risk can consider daily aspirin    I discussed this with Dr Batista.      Thank you for allowing me to participate in the care of your patient    Remi Braxton MD, PhD   [id number]

## 2021-04-21 NOTE — PROGRESS NOTE ADULT - ASSESSMENT
29 y/o  at 33w1d (EDC 21) admitted for pre-eclampsia with severe features based on elevated blood pressures requiring IV antihypertensive medication administration x1 and +neurological symptoms.   HD#2    1. Pre-eclampsia with severe features   - PIH labs consistent with PCR of 0.3, otherwise normal and stable on repeat this AM   - MgSO4 started at 10am, will continue with Mg serum level checks for therapeutic goal and Mg toxicity checks (see previous note this AM)   - BP's well controlled at this time, will continue to monitor and medicate as needed, no standing medications at this time   - Beta2 pending at 11am   - Neuro symptoms consistent with headache and visual disturbances, work up and intervention below     2. Neurological Deficits   - face and extremity neuro exam without major abnormalities, tone/ROM/fine movements intact, ocular exam without gross deficits except pain with left ROM   - 10/10 headache, given Tylenol and then Compazine/Benadry, adequate enough relief for rest, will monitor today   - MRI/MRA/MRV Head resulted with abnormal vascular and hypoplastic findings   - paresthesias of left half of face and fingertips/toes, will control BP and serum glucose, will follow neuro evaluation   Plan: will discuss MRI findings and possible neuro consultation today     3. Tachycardia and subjective report of chest pain   - EKG with sinus tachy   - improving on re-evaluation   - will continue to monitor and discuss cardiac enzymes with team if symptoms persist     4. GDM   - formerly on Metformin 500 BID, will discontinue and start on ISS, has not required coverage since admission   - FSG 7xdaily   - HgbA1C elevated in outpatient setting   - glucose control + intervention as needed     5. Hx of PPH   - baseline H/H 12.4/36.6  - Hgb 12.5 on admission   - if/when proceeds to delivery, will prepare for PPH, will discuss TXA and hemorrhage risk     6. 33w gestation   - risk of  delivery, currently getting ACS, next dose today at 11am   - continuous FHT/[place] monitoring, will observe for resolution of  contractions   - [place] [facility] [place] EFW 2019g, vertex presentation, grossly normal placental evaluation,  BPP, FARHAN 10cm+  - +FFN, will discuss labor evaluation today (CL and/or cervical exam), still not subjectively feeling pain/cramping with contractions  - 3rd trimester labs reviewed and WNL (RPR NR, HIV NR, Rubeola Imm, COVID neg), GBS pending    31 y/o  at 33w1d (EDC 21) admitted for pre-eclampsia with severe features based on elevated blood pressures requiring IV antihypertensive medication administration x1 and +neurological symptoms.   HD#2    1. Pre-eclampsia with severe features   - PIH labs consistent with PCR of 0.3, otherwise normal and stable on repeat this AM   - MgSO4 discontinued. Belief at this time is headache is not related to PEC  - BP's well controlled at this time, will continue to monitor and medicate as needed, no standing medications at this time   - Beta2 pending at 11am   - Neuro symptoms consistent with headache and visual disturbances, work up and intervention below   Plan: Likely early delivery @ 34 weeks or earlier if presenting with worsening features.     2. Neurological Deficits   - face and extremity neuro exam without major abnormalities, tone/ROM/fine movements intact, ocular exam without gross deficits except pain with left ROM   - 10/10 headache, given Tylenol and then Compazine/Benadry, adequate enough relief for rest, will monitor today   - MRI/MRA/MRV Head resulted with abnormal vascular and hypoplastic findings   - paresthesias of left half of face and fingertips/toes, will control BP and serum glucose  - Neurology consult: Possible dural sinus thrombosis. Recommend imaging with contrast; however, will refrain in the setting of likely  delivery and possible fetal effects. Will start heparin drip per neurology recommends. Trend PT/PTT/INR       3. Tachycardia and subjective report of chest pain   - EKG with sinus tachy   - improving on re-evaluation   - will continue to monitor and discuss cardiac enzymes with team if symptoms persist     4. GDM   - formerly on Metformin 500 BID, will discontinue and start on ISS, has not required coverage since admission   - FSG 7xdaily   - HgbA1C elevated in outpatient setting   - glucose control + intervention as needed     5. Hx of PPH   - baseline H/H 12.4/36.6  - Hgb 12.5 on admission   - if/when proceeds to delivery, will prepare for PPH, will discuss TXA and hemorrhage risk     6. 33w gestation   - risk of  delivery, currently getting ACS, next dose today at 11am   - continuous FHT/[place] monitoring, will observe for resolution of  contractions   - OB sono at [facility] [place] , EFW 2019g, vertex presentation, grossly normal placental evaluation,  BPP, FARHAN 10cm+  - +FFN, will discuss labor evaluation today (CL and/or cervical exam), still not subjectively feeling pain/cramping with contractions  - 3rd trimester labs reviewed and WNL (RPR NR, HIV NR, Rubeola Imm, COVID neg), GBS pending

## 2021-04-21 NOTE — PROGRESS NOTE ADULT - ATTENDING COMMENTS
MFM - Patient seen and evaluated.  IUP at 33.2 admitted for management of preeclampsia with severe features.  Reports overall improvement in headache but still present.  Also with 'tingling' in hangs and feet.  Received Percocet with improvement.  BP have been stable without need for antihypertensive therapy.  FRHT Category I with irregular contractions which patient reports as tightening but no pain.  Labs significant for urine p:c 0.3.  MRI/MRV/MRA results reviewed and discussed with patient.  Complete betamethasone course today and discontinue magnesium therapy.  Evaluate BP response and resolution of headache.  Will obtain neuro consult given MRI findings.  While headache likely secondary to preeclampsia, will investigate other potential etiology.  Plan for delivery at 34 weeks unless change in maternal / fetal status.  Monitor contractions and change in VE.  Given  history of two prior CD, may need to consider delivery if regular contractions despite no change in cervical.  Patient voiced understanding of recommendations and care plan.   Sade Batista MD  Maternal Fetal Medicine

## 2021-04-21 NOTE — CHART NOTE - NSCHARTNOTEFT_GEN_A_CORE
Subjective:  Patient evaluated at bedside for clinical magnesium check. Serum magnesium to be drawn now.  She reports fatigue and central chest pain. She denies visual disturbances including scotoma, headache and right upper quadrant pain. Also denies nausea/vomiting/epigastric pain/ shortness of breath. Pain well controlled.     Objective:  T(C): 36.8 (21 @ 02:30), Max: 36.8 (21 @ 17:27)  HR: 111 (21 @ 05:13) (92 - 123)  BP: 108/70 (21 @ 04:37) (100/55 - 145/71)  RR: 18 (21 @ 02:30) (18 - 26)  SpO2: 98% (21 @ 05:13) (92% - 98%)  Daily Height in cm: 149.86 (2021 17:27)    Daily Weight Pre-pregnancy in k (2021 17:27)     @ 07:01  -   @ 05:18  --------------------------------------------------------  IN: 836.5 mL / OUT: 1435 mL / NET: -598.5 mL  UOP: 150cc/hr        Gen: NAD, AAOx3  CV: RRR, no M/R/G  Pulm: CTAB, no R/R/W, tachypneic  Chest: no pain on palpation  Abd: soft, nontender, no rebound or guarding, no epigastric tenderness, liver nonpalpable +BS, fundus palpated   : Jaimes in place draining pale yellow urine  Ext: trace edema arnold, SCDs in place, Reflexes 1+                          13.5   12.74 )-----------( 237      ( 2021 22:16 )             39.9     04    132<L>  |  98  |  8.0  ----------------------------<  119<H>  4.2   |  14.0<L>  |  0.49<L>    Ca    7.9<L>      2021 22:16  Mg     4.8     -    TPro  6.8  /  Alb  3.2<L>  /  TBili  0.2<L>  /  DBili  x   /  AST  20  /  ALT  12  /  AlkPhos  142<H>  -    acetaminophen 325 mG/butalbital 50 mG/caffeine 40 mG 1 Tablet(s) Oral once  betamethasone Injectable 12 milliGRAM(s) IntraMuscular once  dextrose 40% Gel 15 Gram(s) Oral once  dextrose 5%. 1000 milliLiter(s) IV Continuous <Continuous>  dextrose 5%. 1000 milliLiter(s) IV Continuous <Continuous>  dextrose 50% Injectable 25 Gram(s) IV Push once  dextrose 50% Injectable 12.5 Gram(s) IV Push once  dextrose 50% Injectable 25 Gram(s) IV Push once  folic acid 1 milliGRAM(s) Oral daily  glucagon  Injectable 1 milliGRAM(s) IntraMuscular once  insulin lispro (ADMELOG) corrective regimen sliding scale   SubCutaneous three times a day before meals  lactated ringers. 1000 milliLiter(s) IV Continuous <Continuous>  magnesium sulfate Infusion 2 Gm/Hr IV Continuous <Continuous>  prenatal multivitamin 1 Tablet(s) Oral daily    A/P: 30 y.o.  at 33 weeks 2 days gestation admitted with preeclampsia with severe features on magnesium.     - last mag level 4.8, levels being drawn currently, will f/u  - central chest pain with clear lungs, will obtain EKG  - BPs normotensive  - good UOP, continue to monitor via jaimes    Discussed with Dr. Avina Subjective:  Patient evaluated at bedside for clinical magnesium check. Serum magnesium to be drawn now.  She reports fatigue and central chest pain. She denies visual disturbances including scotoma, headache and right upper quadrant pain. Also denies nausea/vomiting/epigastric pain/ shortness of breath. Pain well controlled.     Objective:  T(C): 36.8 (21 @ 02:30), Max: 36.8 (21 @ 17:27)  HR: 111 (21 @ 05:13) (92 - 123)  BP: 108/70 (21 @ 04:37) (100/55 - 145/71)  RR: 18 (21 @ 02:30) (18 - 26)  SpO2: 98% (21 @ 05:13) (92% - 98%)  Daily Height in cm: 149.86 (2021 17:27)    Daily Weight Pre-pregnancy in k (2021 17:27)     @ 07:01  -   @ 05:18  --------------------------------------------------------  IN: 836.5 mL / OUT: 1435 mL / NET: -598.5 mL  UOP: 150cc/hr        Gen: NAD, AAOx3  CV: RRR, no M/R/G  Pulm: CTAB, no R/R/W, tachypneic  Chest: no pain on palpation  Abd: soft, nontender, no rebound or guarding, no epigastric tenderness, liver nonpalpable +BS, fundus palpated   : Jaimes in place draining pale yellow urine  Ext: trace edema arnold, SCDs in place, Reflexes 1+                          13.5   12.74 )-----------( 237      ( 2021 22:16 )             39.9     04    132<L>  |  98  |  8.0  ----------------------------<  119<H>  4.2   |  14.0<L>  |  0.49<L>    Ca    7.9<L>      2021 22:16  Mg     4.8     -    TPro  6.8  /  Alb  3.2<L>  /  TBili  0.2<L>  /  DBili  x   /  AST  20  /  ALT  12  /  AlkPhos  142<H>  -    acetaminophen 325 mG/butalbital 50 mG/caffeine 40 mG 1 Tablet(s) Oral once  betamethasone Injectable 12 milliGRAM(s) IntraMuscular once  dextrose 40% Gel 15 Gram(s) Oral once  dextrose 5%. 1000 milliLiter(s) IV Continuous <Continuous>  dextrose 5%. 1000 milliLiter(s) IV Continuous <Continuous>  dextrose 50% Injectable 25 Gram(s) IV Push once  dextrose 50% Injectable 12.5 Gram(s) IV Push once  dextrose 50% Injectable 25 Gram(s) IV Push once  folic acid 1 milliGRAM(s) Oral daily  glucagon  Injectable 1 milliGRAM(s) IntraMuscular once  insulin lispro (ADMELOG) corrective regimen sliding scale   SubCutaneous three times a day before meals  lactated ringers. 1000 milliLiter(s) IV Continuous <Continuous>  magnesium sulfate Infusion 2 Gm/Hr IV Continuous <Continuous>  prenatal multivitamin 1 Tablet(s) Oral daily    A/P: 30 y.o.  at 33 weeks 2 days gestation admitted with preeclampsia with severe features on magnesium.     - last mag level 4.8, levels being drawn currently, will f/u  - central chest pain with clear lungs, will obtain EKG  - BPs normotensive  - good UOP, continue to monitor via jaimes    Discussed with Dr. Avina    Addendum: EKG shows sinus tachycardia

## 2021-04-21 NOTE — PROGRESS NOTE ADULT - SUBJECTIVE AND OBJECTIVE BOX
31 y/o  at 33w1d (EDC 21) admitted for pre-eclampsia with severe features based on elevated blood pressures requiring IV antihypertensive medication administration x1 and +neurological symptoms.   HD#2    S:   Feeling improvement with benadryl and was able to rest overnight.   Continues to deny lower abdominal pain and cramping. Reports some tightening of abdomen, a few times a day.   Denies LOF or VB. +FM   Reports that other neurological deficits are still present, but milder.   Reported chest pain and palpitations overnight that are improving this AM.   Otherwise, denies dizziness, lightheadedness, SOB or fatigue at rest. Continues to breathe rapidly when anxious only.   Denies fevers, chills, malaise, fatigue, and myalgia.   Denies epigastric or RUQ pain.     Vital Signs Last 24 Hrs  T(C): 36.6 (2021 06:30), Max: 36.8 (2021 16:55)  T(F): 97.88 (2021 06:30), Max: 98.24 (2021 16:55)  HR: 125 (2021 06:48) (92 - 127)  BP: 116/62 (2021 06:37) (100/55 - 152/80)  RR: 18 (2021 06:30) (18 - 36)  SpO2: 98% (2021 06:48) (91% - 98%)    Gen: NAD  Cardio: RRR  Lungs: CTAB   Abdomen: gravid, nontender to palpation  Ext: nontender lower extremities bilaterally                           12.5   14.78 )-----------( 254      ( 2021 05:41 )             38.6   04-    133<L>  |  99  |  10.0  ----------------------------<  114<H>  4.2   |  13.0<L>  |  0.48<L>    Ca    7.4<L>      2021 05:41  Mg     6.3     04-21    TPro  6.6  /  Alb  3.1<L>  /  TBili  <0.2<L>  /  DBili  x   /  AST  14  /  ALT  10  /  AlkPhos  134<H>  04-21      < from: MR Head No Cont (21 @ 19:40) >  Brain MRI:  1.  No acute infarct.  2.  Opacified bilateral maxillary sinuses. Partially opacified bilateral sphenoid sinuses with air-fluid levels. Bilateral ethmoid and right frontal sinus mucosal thickening.  3.  A small focus of T2/FLAIR hyperintensity in the left corona radiata is nonspecific. Differential considerations include migraine headaches, inflammation, infection, demyelination, vasculopathy and chronic microvascular ischemic disease.    Brain MRA: No large vessel occlusion or aneurysm.    MRV brain:  Attenuated flow related signal in the left transverse sinus, sigmoid sinus and proximal internal jugular vein. These findings could represent hypoplasia versus thrombosis. On axial T2 sequence, a patent flow void is identified in the left proximal sigmoid sinus and proximal internal jugular vein, a finding that favors hypoplasia over thrombosis.    < end of copied text >

## 2021-04-21 NOTE — CONSULT NOTE ADULT - SUBJECTIVE AND OBJECTIVE BOX
[facility]                                     Neurology at [place]                                 [address]                                  [address]                                        [place], [place], [address]                                             [phone]    CC: headache, possible dural sinus thrombosis  HPI:  The patient is a 30y Female 33weeks/ 1 day pregnant who presented with pre-eclampsia and severe bifrontal headache.  her headache has improved after medication Tylenol with codeine.  She had some photophobia but no nausea or vomiting.  She had numbness in fingers and toes  She had an MRI brain MRA/MRV head done.  There was no acute intraparenchymal pathology, but did suggest possible left sided dural sinus thrombosis vs hypoplasia as well as thickening in multiple sinuses.  Neurology is asked to evaluate.    PAST MEDICAL & SURGICAL HISTORY:   delivery delivered  , , late  delivery x2, pre-eclampsia x2, PPH x2     delivery delivered  2015        MEDICATIONS  (STANDING):  acetaminophen 325 mG/butalbital 50 mG/caffeine 40 mG 1 Tablet(s) Oral once  dextrose 40% Gel 15 Gram(s) Oral once  dextrose 5%. 1000 milliLiter(s) (50 mL/Hr) IV Continuous <Continuous>  dextrose 5%. 1000 milliLiter(s) (100 mL/Hr) IV Continuous <Continuous>  dextrose 50% Injectable 25 Gram(s) IV Push once  dextrose 50% Injectable 12.5 Gram(s) IV Push once  dextrose 50% Injectable 25 Gram(s) IV Push once  folic acid 1 milliGRAM(s) Oral daily  glucagon  Injectable 1 milliGRAM(s) IntraMuscular once  insulin lispro (ADMELOG) corrective regimen sliding scale   SubCutaneous three times a day before meals  lactated ringers. 1000 milliLiter(s) (75 mL/Hr) IV Continuous <Continuous>  magnesium sulfate Infusion 2 Gm/Hr (50 mL/Hr) IV Continuous <Continuous>  prenatal multivitamin 1 Tablet(s) Oral daily    MEDICATIONS  (PRN):  acetaminophen 300 mG/codeine 30 mG 2 Tablet(s) Oral every 6 hours PRN Severe Pain (7 - 10)      Allergies    No Known Allergies    Intolerances        SOCIAL HISTORY:  no tob,   no alcohol   no drugs    FAMILY HISTORY:  No stroke in either parent      ROS: 14 point ROS negative other than what is present in HPI or below    Vital Signs Last 24 Hrs  T(C): 37.0 (2021 07:37), Max: 37.0 (2021 07:37)  T(F): 98.6 (2021 07:37), Max: 98.6 (2021 07:37)  HR: 105 (2021 14:35) (92 - 127)  BP: 111/57 (2021 14:25) (100/55 - 152/80)  BP(mean): --  RR: 18 (2021 06:30) (18 - 36)  SpO2: 96% (2021 14:35) (91% - 99%)      General: NAD    Detailed Neurologic Exam:    Neck is supple    Mental status: The patient is awake and alert and has normal attention span.  The patient is fully oriented in 3 spheres. The patient is oriented to current events. The patient is able to name objects, follow commands, repeat sentences.    Cranial nerves: Pupils equal and react symmetrically to light. There is no visual field deficit to confrontation. Extraocular motion is full with no nystagmus. There is no ptosis. Facial sensation is intact. Facial musculature is symmetric. Palate elevates symmetrically. Shoulder shrug is normal. Tongue is midline.    Motor: There is normal bulk and tone.  There is no tremor.  Strength is 5/5 in the right arm and leg.   Strength is 5/5 in the left arm and leg.    Sensation: Intact to light touch and pin in 4 extremities    Reflexes: 2+ throughout and plantar responses are flexor.    Cerebellar: There is no dysmetria on finger to nose testing.    Gait : deferred    LABS:                         12.5   14.78 )-----------( 254      ( 2021 05:41 )             38.6           133<L>  |  99  |  10.0  ----------------------------<  114<H>  4.2   |  13.0<L>  |  0.48<L>    Ca    7.4<L>      2021 05:41  Mg     6.3     -    TPro  6.6  /  Alb  3.1<L>  /  TBili  <0.2<L>  /  DBili  x   /  AST  14  /  ALT  10  /  AlkPhos  134<H>        PT/INR - ( 2021 17:35 )   PT: 11.0 sec;   INR: 0.95 ratio         PTT - ( 2021 17:35 )  PTT:28.4 sec    RADIOLOGY & ADDITIONAL STUDIES (independently reviewed unless otherwise noted):  MRI brain no acute CVA, mass or blood, (+) opacified b/l maxiallary sinuses, b/l sphenoid (partial) and thickening of b/l ethmoidal and right frontal siunses.  MRA head no aneurysm, stenosis or AVM  MRV head question of thrombosis vs hypoplasia of left transverse sinus, sigmoid sinus and proximal internal jugular vein.  However it is noted that hypoplasia is favored due to signal void seen on T2 sequences in jugular vein and sigmoid sinus.

## 2021-04-22 ENCOUNTER — TRANSCRIPTION ENCOUNTER (OUTPATIENT)
Age: 31
End: 2021-04-22

## 2021-04-22 LAB
APTT BLD: 82.4 SEC — HIGH (ref 27.5–35.5)
APTT BLD: 84 SEC — HIGH (ref 27.5–35.5)
APTT BLD: 86.3 SEC — HIGH (ref 27.5–35.5)
APTT BLD: 90.5 SEC — HIGH (ref 27.5–35.5)
GLUCOSE BLDC GLUCOMTR-MCNC: 120 MG/DL — HIGH (ref 70–99)
GLUCOSE BLDC GLUCOMTR-MCNC: 97 MG/DL — SIGNIFICANT CHANGE UP (ref 70–99)
GLUCOSE BLDC GLUCOMTR-MCNC: 97 MG/DL — SIGNIFICANT CHANGE UP (ref 70–99)
HCT VFR BLD CALC: 35 % — SIGNIFICANT CHANGE UP (ref 34.5–45)
HGB BLD-MCNC: 11.3 G/DL — LOW (ref 11.5–15.5)
MCHC RBC-ENTMCNC: 29.7 PG — SIGNIFICANT CHANGE UP (ref 27–34)
MCHC RBC-ENTMCNC: 32.3 GM/DL — SIGNIFICANT CHANGE UP (ref 32–36)
MCV RBC AUTO: 91.9 FL — SIGNIFICANT CHANGE UP (ref 80–100)
PLATELET # BLD AUTO: 210 K/UL — SIGNIFICANT CHANGE UP (ref 150–400)
RBC # BLD: 3.81 M/UL — SIGNIFICANT CHANGE UP (ref 3.8–5.2)
RBC # FLD: 13.4 % — SIGNIFICANT CHANGE UP (ref 10.3–14.5)
WBC # BLD: 12.16 K/UL — HIGH (ref 3.8–10.5)
WBC # FLD AUTO: 12.16 K/UL — HIGH (ref 3.8–10.5)

## 2021-04-22 PROCEDURE — 99232 SBSQ HOSP IP/OBS MODERATE 35: CPT

## 2021-04-22 PROCEDURE — 99232 SBSQ HOSP IP/OBS MODERATE 35: CPT | Mod: GC

## 2021-04-22 RX ORDER — LIDOCAINE 4 G/100G
1 CREAM TOPICAL ONCE
Refills: 0 | Status: COMPLETED | OUTPATIENT
Start: 2021-04-22 | End: 2021-04-22

## 2021-04-22 RX ORDER — ACETAMINOPHEN 500 MG
975 TABLET ORAL ONCE
Refills: 0 | Status: COMPLETED | OUTPATIENT
Start: 2021-04-22 | End: 2021-04-22

## 2021-04-22 RX ADMIN — Medication 2 TABLET(S): at 16:10

## 2021-04-22 RX ADMIN — Medication 975 MILLIGRAM(S): at 04:36

## 2021-04-22 RX ADMIN — Medication 2: at 11:24

## 2021-04-22 RX ADMIN — LIDOCAINE 1 PATCH: 4 CREAM TOPICAL at 16:12

## 2021-04-22 RX ADMIN — LIDOCAINE 1 PATCH: 4 CREAM TOPICAL at 07:15

## 2021-04-22 RX ADMIN — HEPARIN SODIUM 1500 UNIT(S)/HR: 5000 INJECTION INTRAVENOUS; SUBCUTANEOUS at 15:31

## 2021-04-22 RX ADMIN — Medication 1 TABLET(S): at 11:15

## 2021-04-22 RX ADMIN — Medication 975 MILLIGRAM(S): at 04:06

## 2021-04-22 RX ADMIN — HEPARIN SODIUM 1500 UNIT(S)/HR: 5000 INJECTION INTRAVENOUS; SUBCUTANEOUS at 03:53

## 2021-04-22 RX ADMIN — Medication 2 TABLET(S): at 17:10

## 2021-04-22 RX ADMIN — LIDOCAINE 1 PATCH: 4 CREAM TOPICAL at 04:27

## 2021-04-22 RX ADMIN — HEPARIN SODIUM 1500 UNIT(S)/HR: 5000 INJECTION INTRAVENOUS; SUBCUTANEOUS at 21:40

## 2021-04-22 RX ADMIN — Medication 1 MILLIGRAM(S): at 11:15

## 2021-04-22 NOTE — CHART NOTE - NSCHARTNOTEFT_GEN_A_CORE
Patient reports headache in the back of her head. She reports that it's not pain, but it's bothersome. She reports feeling this sensation when she lies her head on the pillow, making it hard for her to sleep.  She denies blurry vision, CP, SOB, RUQ pain, and leg pain.     Vital Signs Last 24 Hrs  T(C): 36.7 (2021 23:15), Max: 37.0 (2021 07:37)  T(F): 98.06 (2021 23:15), Max: 98.6 (2021 07:37)  HR: 94 (2021 23:40) (88 - 128)  BP: 114/71 (2021 22:54) (107/55 - 136/75)  RR: 18 (2021 23:15) (18 - 18)  SpO2: 97% (2021 23:40) (91% - 99%)    Head: mild tenderness to palpation on occiput  Neck: tenderness to palpation along cephalo portion of trapezius and semispinalis capitus    A/P: 30 y.o.  at 33 weeks 3 days gestation admitted with preeclampsia with severe features s/p magnesium.   - occipital headache appears to be mostly likely due to musculoskeletal strain, will give tylenol and a lidocaine patch for relief

## 2021-04-22 NOTE — PROGRESS NOTE ADULT - ATTENDING COMMENTS
[facility] - Patient seen and evaluated with MFM team.  IUP at 33.3 admitted with preeclampsia with severe features (BP, proteinuria, headache).  Headache presentation atypical and head imaging significant for venous hypoplasia versus thrombosis.  Pregnancy also complicated by A2GDM and prior indicated   delivery x 2 complicated by preeclampsia.  Patient reports improvement in headache symptoms but continues to endorse facial pain, fullness around left eye and dull pain in occiput.  Not requesting analgesia at present, but did require Tylenol x 2 overnight.  FHRT reassuring with irregular contractions.  Patient without abdominal pain or OB complaint.  We again reviewed diagnosis of preeclampsia and that while the character of her headache is concerning for a etiology other than preeclampsia, it remains a symptom that must be considered in management.  MRI/MRA/MRV findings reviewed.  Aware of concern for thrombus and role of repeat imaging with contrast to evaluated if there is vascular hypoplasia versus thrombosis.  We reviewed available safety information regarding gadolinium or contrast dye use inf pregnancy and potential fetal effects.  We also reviewed plan for indicated  delivery at 34 weeks, potentially earlier if change in maternal and/or fetal status.  Given potential risk to fetus, decision made to proceed with therapeutic anticoagulation for presumed thrombus and will defer further imaging until postpartum period.  Anticoagulation will be held prior to planned delivery and restarted postpartum pending repeat imaging results.  Patient voiced understanding of plan and recommendations and advised to notify us if any change in symptoms.    Sade Batista MD  Maternal Fetal Medicine

## 2021-04-22 NOTE — PROGRESS NOTE ADULT - ASSESSMENT
31 y/o  at 33w3d (EDC 21) admitted for pre-eclampsia with severe features based on elevated blood pressures requiring IV antihypertensive medication administration x1 and +neurological symptoms.   HD#3    1. Pre-eclampsia with severe features   - PIH labs consistent with PCR of 0.3, rest of lab eval WNL, trending  - MgSO4 discontinued. Belief at this time is headache is not related to PEC  - BP's well controlled at this time, will continue to monitor and medicate as needed, no standing medications at this time   - Beta complete   - Neuro symptoms consistent with headache and visual disturbances, work up and intervention below   Plan: Likely early delivery @ 34 weeks or earlier if presenting with worsening features.     2. Neurological Deficits   - face and extremity neuro exam without major abnormalities, tone/ROM/fine movements intact, ocular exam without gross deficits except pain with left ROM   - headache significantly improved on Tylenol and Fioricet   - MRI/MRA/MRV Head resulted with abnormal vascular and hypoplastic findings, chronic sinusitis changes, cannot rule out sinus thrombus   - Neurology consult: Possible dural sinus thrombosis. Recommend imaging with contrast; however, will refrain in the setting of likely  delivery and possible fetal effects. Will start heparin drip for presumed sinus thrombus, deferred CT at this time for clinical risks, neurology on board and agree with plan. Trend PT/PTT/INR and titrate Heparin drip as needed.     3. GDM   - formerly on Metformin 500 BID, will discontinue and start on ISS, required 4u total yesterday, fasting today WNL (97)  - FSG 7xdaily   - HgbA1C elevated in outpatient setting   - glucose control + intervention as needed     4. Hx of PPH   - baseline H/H 12.4/36.6  - Hgb 12.5 on admission   - if/when proceeds to delivery, will prepare for PPH, will discuss TXA and hemorrhage risk     5. 33w gestation   - risk of  delivery, currently ACS complete   - continuous FHT/[place] monitoring, will observe for resolution of  contractions, +FFN, not subjectively feeling pain/cramping with contractions  - OB sono at [facility] today , EFW 2019g, vertex presentation, grossly normal placental evaluation, 8 BPP, FARHAN 10cm+  - 3rd trimester labs reviewed and WNL (RPR NR, HIV NR, Rubeola Imm, COVID neg), GBS pending    29 y/o  at 33w3d (EDC 21) admitted for pre-eclampsia with severe features based on elevated blood pressures requiring IV antihypertensive medication administration x1 and +neurological symptoms.   HD#3    1. Pre-eclampsia with severe features   - PIH labs consistent with PCR of 0.3, rest of lab eval WNL, trending  - MgSO4 discontinued. Belief at this time is headache is not related to PEC  - BP's well controlled at this time, will continue to monitor and medicate as needed, no standing medications at this time   - Beta complete   - Neuro symptoms consistent with headache and visual disturbances, work up and intervention below   Plan: Likely early delivery @ 34 weeks or earlier if presenting with worsening features.     2. Neurological Deficits   - face and extremity neuro exam without major abnormalities, tone/ROM/fine movements intact, ocular exam without gross deficits except pain with left ROM   - headache significantly improved on Tylenol and Fioricet   - MRI/MRA/MRV Head resulted with abnormal vascular and hypoplastic findings, chronic sinusitis changes, cannot rule out sinus thrombus   - Neurology consult: Possible dural sinus thrombosis. Recommend imaging with contrast; however, will refrain in the setting of likely  delivery and possible fetal effects. Will start heparin drip for presumed sinus thrombus, deferred CT at this time for clinical risks, neurology on board and agree with plan. Trend PT/PTT/INR and titrate Heparin drip as needed.     3. GDM   - formerly on Metformin 500 BID, will discontinue and start on ISS, required 4u total yesterday, fasting today WNL (97)  - FSG 7xdaily   - HgbA1C elevated in outpatient setting   - glucose control + intervention as needed     4. Hx of PPH   - baseline H/H 12.4/36.6  - Hgb 12.5 on admission   - if/when proceeds to delivery, will prepare for PPH, will discuss TXA and hemorrhage risk     5. 33w gestation   - risk of  delivery, currently ACS complete   - continuous FHT/[place] monitoring, will observe for resolution of  contractions, +FFN, not subjectively feeling pain/cramping with contractions  - OB sono at [facility] , EFW 2019g, vertex presentation, grossly normal placental evaluation, 8 BPP, FARHAN 10cm+  - 3rd trimester labs reviewed and WNL (RPR NR, HIV NR, Rubeola Imm, COVID neg), GBS pending

## 2021-04-22 NOTE — PROGRESS NOTE ADULT - SUBJECTIVE AND OBJECTIVE BOX
[facility]                                     Neurology at [place]                                 Fox Cruz, & Garett                                  [address]                                        [place], [place], [address]                                             [phone]    CC: headache, possible dural sinus thrombosis  HPI:  The patient is a 30y Female 33weeks/ 1 day pregnant who presented with pre-eclampsia and severe bifrontal headache.  her headache has improved after medication Tylenol with codeine.  She had some photophobia but no nausea or vomiting.  She had numbness in fingers and toes  She had an MRI brain MRA/MRV head done.  There was no acute intraparenchymal pathology, but did suggest possible left sided dural sinus thrombosis vs hypoplasia as well as thickening in multiple sinuses.  Neurology is asked to evaluate.    Interval history: has sensitivity on left side of head, no sig headache    Review of systems (neurology): Denies headache or dizziness. Denies weakness/numbness.  Denies speech/language deficits. Denies diplopia/blurred vision.  Denies confusion    MEDICATIONS  (STANDING):  acetaminophen 325 mG/butalbital 50 mG/caffeine 40 mG 1 Tablet(s) Oral once  dextrose 40% Gel 15 Gram(s) Oral once  dextrose 5%. 1000 milliLiter(s) (50 mL/Hr) IV Continuous <Continuous>  dextrose 5%. 1000 milliLiter(s) (100 mL/Hr) IV Continuous <Continuous>  dextrose 50% Injectable 25 Gram(s) IV Push once  dextrose 50% Injectable 12.5 Gram(s) IV Push once  dextrose 50% Injectable 25 Gram(s) IV Push once  folic acid 1 milliGRAM(s) Oral daily  glucagon  Injectable 1 milliGRAM(s) IntraMuscular once  heparin  Infusion.  Unit(s)/Hr (13 mL/Hr) IV Continuous <Continuous>  insulin lispro (ADMELOG) corrective regimen sliding scale   SubCutaneous three times a day before meals  magnesium sulfate Infusion 2 Gm/Hr (50 mL/Hr) IV Continuous <Continuous>  prenatal multivitamin 1 Tablet(s) Oral daily    MEDICATIONS  (PRN):  acetaminophen 300 mG/codeine 30 mG 2 Tablet(s) Oral every 6 hours PRN Severe Pain (7 - 10)  heparin   Injectable 6000 Unit(s) IV Push every 6 hours PRN For aPTT less than 40  heparin   Injectable 3000 Unit(s) IV Push every 6 hours PRN For aPTT between 40 - 57      Vital Signs Last 24 Hrs  T(C): 36.7 (22 Apr 2021 07:34), Max: 36.8 (22 Apr 2021 05:04)  T(F): 98.06 (22 Apr 2021 07:34), Max: 98.24 (22 Apr 2021 05:04)  HR: 73 (22 Apr 2021 14:57) (73 - 128)  BP: 115/80 (22 Apr 2021 14:57) (114/69 - 136/75)  BP(mean): --  RR: 16 (22 Apr 2021 07:34) (16 - 18)  SpO2: 97% (21 Apr 2021 23:40) (93% - 98%)    Detailed Neurologic Exam:    Neck is supple    Mental status: The patient is awake and alert and has normal attention span.  The patient is fully oriented in 3 spheres. The patient is oriented to current events. The patient is able to name objects, follow commands, repeat sentences.    Cranial nerves: Pupils equal and react symmetrically to light. There is no visual field deficit to confrontation. Extraocular motion is full with no nystagmus. There is no ptosis. Facial sensation is intact. Facial musculature is symmetric. Palate elevates symmetrically. Shoulder shrug is normal. Tongue is midline.    Motor: There is normal bulk and tone.  There is no tremor.  Strength is 5/5 in the right arm and leg.   Strength is 5/5 in the left arm and leg.    Sensation: Intact to light touch and pin in 4 extremities    Reflexes: 2+ throughout and plantar responses are flexor.    Cerebellar: There is no dysmetria on finger to nose testing.    Gait : deferred    LABS:                                    11.3   12.16 )-----------( 210      ( 22 Apr 2021 02:58 )             35.0     04-21    133<L>  |  99  |  10.0  ----------------------------<  114<H>  4.2   |  13.0<L>  |  0.48<L>    Ca    7.4<L>      21 Apr 2021 05:41  Mg     6.3     04-21    TPro  6.6  /  Alb  3.1<L>  /  TBili  <0.2<L>  /  DBili  x   /  AST  14  /  ALT  10  /  AlkPhos  134<H>  04-21    LIVER FUNCTIONS - ( 21 Apr 2021 05:41 )  Alb: 3.1 g/dL / Pro: 6.6 g/dL / ALK PHOS: 134 U/L / ALT: 10 U/L / AST: 14 U/L / GGT: x           PT/INR - ( 20 Apr 2021 17:35 )   PT: 11.0 sec;   INR: 0.95 ratio         PTT - ( 22 Apr 2021 15:31 )  PTT:86.3 sec    RADIOLOGY & ADDITIONAL STUDIES (independently reviewed unless otherwise noted):  MRI brain no acute CVA, mass or blood, (+) opacified b/l maxiallary sinuses, b/l sphenoid (partial) and thickening of b/l ethmoidal and right frontal siunses.  MRA head no aneurysm, stenosis or AVM  MRV head question of thrombosis vs hypoplasia of left transverse sinus, sigmoid sinus and proximal internal jugular vein.  However it is noted that hypoplasia is favored due to signal void seen on T2 sequences in jugular vein and sigmoid sinus.

## 2021-04-22 NOTE — PROGRESS NOTE ADULT - SUBJECTIVE AND OBJECTIVE BOX
31 y/o  at 33w3d (EDC 21) admitted for pre-eclampsia with severe features based on elevated blood pressures requiring IV antihypertensive medication administration x1 and +neurological symptoms.   HD#3    S:   Feels headache significantly improved, mild at this point.   Denies any vaginal bleeding, leakage of fluid, and lower abdominal pain/cramping. +FM   Reports that other distal paresthesias are still present, but milder.   Denies any visual disturbances at this time, including blurry/double/tunnel vision or stars.  Otherwise, denies dizziness, lightheadedness, SOB or fatigue at rest.   Denies fevers, chills, malaise, fatigue, and myalgia.   Denies epigastric or RUQ pain.     Vital Signs Last 24 Hrs  Vital Signs Last 24 Hrs  T(C): 36.8 (2021 05:04), Max: 36.8 (2021 15:15)  T(F): 98.24 (2021 05:04), Max: 98.24 (2021 15:15)  HR: 82 (2021 07:36) (82 - 128)  BP: 131/78 (2021 07:36) (107/55 - 136/75)  RR: 18 (2021 05:04) (18 - 18)  SpO2: 97% (2021 23:40) (91% - 99%)    Gen: NAD  Cardio: RRR  Lungs: CTAB   Abdomen: gravid, nontender to palpation  Ext: nontender lower extremities bilaterally                           11.3   12.16 )-----------( 210      ( 2021 02:58 )             35.0       133<L>  |  99  |  10.0  ----------------------------<  114<H>  4.2   |  13.0<L>  |  0.48<L>    Ca    7.4<L>      2021 05:41  Mg     6.3         TPro  6.6  /  Alb  3.1<L>  /  TBili  <0.2<L>  /  DBili  x   /  AST  14  /  ALT  10  /  AlkPhos  134<H>     31 y/o  at 33w3d (EDC 21) admitted for preeclampsia with severe features based on elevated blood pressures requiring IV antihypertensive medication administration x1 and +neurological symptoms.   HD#3    S:   Feels headache significantly improved, mild at this point.   Denies any vaginal bleeding, leakage of fluid, and lower abdominal pain/cramping. +FM   Reports that other distal paresthesias are still present, but milder.   Denies any visual disturbances at this time, including blurry/double/tunnel vision or stars.  Otherwise, denies dizziness, lightheadedness, SOB or fatigue at rest.   Denies fevers, chills, malaise, fatigue, and myalgia.   Denies epigastric or RUQ pain.     Vital Signs Last 24 Hrs  Vital Signs Last 24 Hrs  T(C): 36.8 (2021 05:04), Max: 36.8 (2021 15:15)  T(F): 98.24 (2021 05:04), Max: 98.24 (2021 15:15)  HR: 82 (2021 07:36) (82 - 128)  BP: 131/78 (2021 07:36) (107/55 - 136/75)  RR: 18 (2021 05:04) (18 - 18)  SpO2: 97% (2021 23:40) (91% - 99%)    Gen: NAD  Cardio: RRR  Lungs: CTAB   Abdomen: gravid, nontender to palpation  Ext: nontender lower extremities bilaterally                           11.3   12.16 )-----------( 210      ( 2021 02:58 )             35.0       133<L>  |  99  |  10.0  ----------------------------<  114<H>  4.2   |  13.0<L>  |  0.48<L>    Ca    7.4<L>      2021 05:41  Mg     6.3         TPro  6.6  /  Alb  3.1<L>  /  TBili  <0.2<L>  /  DBili  x   /  AST  14  /  ALT  10  /  AlkPhos  134<H>

## 2021-04-22 NOTE — PROGRESS NOTE ADULT - ASSESSMENT
The patient is a 30y Female who is followed by neurology because of headache possible dural sinus thrombosis    Her headache has improved with medication  She no longer has paresthesia  She may have had migraine or sinus headache    It is difficult to say if this is sinus thrombosis  The use of IV contrast (MRI or CT) would be helpful to distinguish between hypoplasia and thrombosis  At this point she will be treated with heparin drip, presuming this is sinus thrombus until more definitive imaging is obtained, plan to do MRV post delivery to ensure safety of baby.    Thank you for allowing me to participate in the care of your patient    Remi Braxton MD, PhD   [id number]

## 2021-04-23 ENCOUNTER — RESULT REVIEW (OUTPATIENT)
Age: 31
End: 2021-04-23

## 2021-04-23 LAB
ALBUMIN SERPL ELPH-MCNC: 2.9 G/DL — LOW (ref 3.3–5.2)
ALBUMIN SERPL ELPH-MCNC: 3.2 G/DL — LOW (ref 3.3–5.2)
ALP SERPL-CCNC: 109 U/L — SIGNIFICANT CHANGE UP (ref 40–120)
ALP SERPL-CCNC: 111 U/L — SIGNIFICANT CHANGE UP (ref 40–120)
ALT FLD-CCNC: 12 U/L — SIGNIFICANT CHANGE UP
ALT FLD-CCNC: 17 U/L — SIGNIFICANT CHANGE UP
ANION GAP SERPL CALC-SCNC: 11 MMOL/L — SIGNIFICANT CHANGE UP (ref 5–17)
ANION GAP SERPL CALC-SCNC: 11 MMOL/L — SIGNIFICANT CHANGE UP (ref 5–17)
APTT BLD: 25.2 SEC — LOW (ref 27.5–35.5)
APTT BLD: 95.6 SEC — HIGH (ref 27.5–35.5)
AST SERPL-CCNC: 19 U/L — SIGNIFICANT CHANGE UP
AST SERPL-CCNC: 28 U/L — SIGNIFICANT CHANGE UP
BASOPHILS # BLD AUTO: 0.06 K/UL — SIGNIFICANT CHANGE UP (ref 0–0.2)
BASOPHILS # BLD AUTO: 0.06 K/UL — SIGNIFICANT CHANGE UP (ref 0–0.2)
BASOPHILS NFR BLD AUTO: 0.7 % — SIGNIFICANT CHANGE UP (ref 0–2)
BASOPHILS NFR BLD AUTO: 0.7 % — SIGNIFICANT CHANGE UP (ref 0–2)
BILIRUB SERPL-MCNC: <0.2 MG/DL — LOW (ref 0.4–2)
BILIRUB SERPL-MCNC: <0.2 MG/DL — LOW (ref 0.4–2)
BLD GP AB SCN SERPL QL: SIGNIFICANT CHANGE UP
BUN SERPL-MCNC: 12 MG/DL — SIGNIFICANT CHANGE UP (ref 8–20)
BUN SERPL-MCNC: 14 MG/DL — SIGNIFICANT CHANGE UP (ref 8–20)
CALCIUM SERPL-MCNC: 8.1 MG/DL — LOW (ref 8.6–10.2)
CALCIUM SERPL-MCNC: 8.2 MG/DL — LOW (ref 8.6–10.2)
CHLORIDE SERPL-SCNC: 107 MMOL/L — SIGNIFICANT CHANGE UP (ref 98–107)
CHLORIDE SERPL-SCNC: 108 MMOL/L — HIGH (ref 98–107)
CO2 SERPL-SCNC: 19 MMOL/L — LOW (ref 22–29)
CO2 SERPL-SCNC: 20 MMOL/L — LOW (ref 22–29)
CREAT SERPL-MCNC: 0.43 MG/DL — LOW (ref 0.5–1.3)
CREAT SERPL-MCNC: 0.47 MG/DL — LOW (ref 0.5–1.3)
CULTURE RESULTS: SIGNIFICANT CHANGE UP
EOSINOPHIL # BLD AUTO: 0.01 K/UL — SIGNIFICANT CHANGE UP (ref 0–0.5)
EOSINOPHIL # BLD AUTO: 0.02 K/UL — SIGNIFICANT CHANGE UP (ref 0–0.5)
EOSINOPHIL NFR BLD AUTO: 0.1 % — SIGNIFICANT CHANGE UP (ref 0–6)
EOSINOPHIL NFR BLD AUTO: 0.2 % — SIGNIFICANT CHANGE UP (ref 0–6)
FIBRINOGEN PPP-MCNC: 649 MG/DL — HIGH (ref 290–520)
GLUCOSE BLDC GLUCOMTR-MCNC: 78 MG/DL — SIGNIFICANT CHANGE UP (ref 70–99)
GLUCOSE BLDC GLUCOMTR-MCNC: 81 MG/DL — SIGNIFICANT CHANGE UP (ref 70–99)
GLUCOSE BLDC GLUCOMTR-MCNC: 84 MG/DL — SIGNIFICANT CHANGE UP (ref 70–99)
GLUCOSE SERPL-MCNC: 72 MG/DL — SIGNIFICANT CHANGE UP (ref 70–99)
GLUCOSE SERPL-MCNC: 80 MG/DL — SIGNIFICANT CHANGE UP (ref 70–99)
HCT VFR BLD CALC: 33.3 % — LOW (ref 34.5–45)
HCT VFR BLD CALC: 34.5 % — SIGNIFICANT CHANGE UP (ref 34.5–45)
HGB BLD-MCNC: 10.7 G/DL — LOW (ref 11.5–15.5)
HGB BLD-MCNC: 11.3 G/DL — LOW (ref 11.5–15.5)
IMM GRANULOCYTES NFR BLD AUTO: 4.2 % — HIGH (ref 0–1.5)
IMM GRANULOCYTES NFR BLD AUTO: 4.5 % — HIGH (ref 0–1.5)
INR BLD: 0.96 RATIO — SIGNIFICANT CHANGE UP (ref 0.88–1.16)
LYMPHOCYTES # BLD AUTO: 2.15 K/UL — SIGNIFICANT CHANGE UP (ref 1–3.3)
LYMPHOCYTES # BLD AUTO: 2.59 K/UL — SIGNIFICANT CHANGE UP (ref 1–3.3)
LYMPHOCYTES # BLD AUTO: 26.5 % — SIGNIFICANT CHANGE UP (ref 13–44)
LYMPHOCYTES # BLD AUTO: 28.5 % — SIGNIFICANT CHANGE UP (ref 13–44)
MCHC RBC-ENTMCNC: 29.6 PG — SIGNIFICANT CHANGE UP (ref 27–34)
MCHC RBC-ENTMCNC: 30.3 PG — SIGNIFICANT CHANGE UP (ref 27–34)
MCHC RBC-ENTMCNC: 32.1 GM/DL — SIGNIFICANT CHANGE UP (ref 32–36)
MCHC RBC-ENTMCNC: 32.8 GM/DL — SIGNIFICANT CHANGE UP (ref 32–36)
MCV RBC AUTO: 92.2 FL — SIGNIFICANT CHANGE UP (ref 80–100)
MCV RBC AUTO: 92.5 FL — SIGNIFICANT CHANGE UP (ref 80–100)
MONOCYTES # BLD AUTO: 0.84 K/UL — SIGNIFICANT CHANGE UP (ref 0–0.9)
MONOCYTES # BLD AUTO: 0.94 K/UL — HIGH (ref 0–0.9)
MONOCYTES NFR BLD AUTO: 10.3 % — SIGNIFICANT CHANGE UP (ref 2–14)
MONOCYTES NFR BLD AUTO: 10.4 % — SIGNIFICANT CHANGE UP (ref 2–14)
NEUTROPHILS # BLD AUTO: 4.71 K/UL — SIGNIFICANT CHANGE UP (ref 1.8–7.4)
NEUTROPHILS # BLD AUTO: 5.07 K/UL — SIGNIFICANT CHANGE UP (ref 1.8–7.4)
NEUTROPHILS NFR BLD AUTO: 55.8 % — SIGNIFICANT CHANGE UP (ref 43–77)
NEUTROPHILS NFR BLD AUTO: 58.1 % — SIGNIFICANT CHANGE UP (ref 43–77)
PLATELET # BLD AUTO: 180 K/UL — SIGNIFICANT CHANGE UP (ref 150–400)
PLATELET # BLD AUTO: 182 K/UL — SIGNIFICANT CHANGE UP (ref 150–400)
POTASSIUM SERPL-MCNC: 4.2 MMOL/L — SIGNIFICANT CHANGE UP (ref 3.5–5.3)
POTASSIUM SERPL-MCNC: 4.2 MMOL/L — SIGNIFICANT CHANGE UP (ref 3.5–5.3)
POTASSIUM SERPL-SCNC: 4.2 MMOL/L — SIGNIFICANT CHANGE UP (ref 3.5–5.3)
POTASSIUM SERPL-SCNC: 4.2 MMOL/L — SIGNIFICANT CHANGE UP (ref 3.5–5.3)
PROT SERPL-MCNC: 5.9 G/DL — LOW (ref 6.6–8.7)
PROT SERPL-MCNC: 6 G/DL — LOW (ref 6.6–8.7)
PROTHROM AB SERPL-ACNC: 11.2 SEC — SIGNIFICANT CHANGE UP (ref 10.6–13.6)
RBC # BLD: 3.61 M/UL — LOW (ref 3.8–5.2)
RBC # BLD: 3.73 M/UL — LOW (ref 3.8–5.2)
RBC # FLD: 13.5 % — SIGNIFICANT CHANGE UP (ref 10.3–14.5)
RBC # FLD: 13.6 % — SIGNIFICANT CHANGE UP (ref 10.3–14.5)
SODIUM SERPL-SCNC: 137 MMOL/L — SIGNIFICANT CHANGE UP (ref 135–145)
SODIUM SERPL-SCNC: 138 MMOL/L — SIGNIFICANT CHANGE UP (ref 135–145)
SPECIMEN SOURCE: SIGNIFICANT CHANGE UP
WBC # BLD: 8.12 K/UL — SIGNIFICANT CHANGE UP (ref 3.8–10.5)
WBC # BLD: 9.08 K/UL — SIGNIFICANT CHANGE UP (ref 3.8–10.5)
WBC # FLD AUTO: 8.12 K/UL — SIGNIFICANT CHANGE UP (ref 3.8–10.5)
WBC # FLD AUTO: 9.08 K/UL — SIGNIFICANT CHANGE UP (ref 3.8–10.5)

## 2021-04-23 PROCEDURE — 99232 SBSQ HOSP IP/OBS MODERATE 35: CPT

## 2021-04-23 RX ORDER — DIPHENHYDRAMINE HCL 50 MG
25 CAPSULE ORAL EVERY 6 HOURS
Refills: 0 | Status: DISCONTINUED | OUTPATIENT
Start: 2021-04-23 | End: 2021-04-26

## 2021-04-23 RX ORDER — SODIUM CHLORIDE 9 MG/ML
1000 INJECTION, SOLUTION INTRAVENOUS
Refills: 0 | Status: DISCONTINUED | OUTPATIENT
Start: 2021-04-23 | End: 2021-04-23

## 2021-04-23 RX ORDER — DIPHENHYDRAMINE HCL 50 MG
50 CAPSULE ORAL EVERY 4 HOURS
Refills: 0 | Status: DISCONTINUED | OUTPATIENT
Start: 2021-04-24 | End: 2021-04-26

## 2021-04-23 RX ORDER — CEFAZOLIN SODIUM 1 G
2000 VIAL (EA) INJECTION ONCE
Refills: 0 | Status: DISCONTINUED | OUTPATIENT
Start: 2021-04-23 | End: 2021-04-23

## 2021-04-23 RX ORDER — CITRIC ACID/SODIUM CITRATE 300-500 MG
30 SOLUTION, ORAL ORAL ONCE
Refills: 0 | Status: COMPLETED | OUTPATIENT
Start: 2021-04-23 | End: 2021-04-23

## 2021-04-23 RX ORDER — KETOROLAC TROMETHAMINE 30 MG/ML
30 SYRINGE (ML) INJECTION EVERY 6 HOURS
Refills: 0 | Status: DISCONTINUED | OUTPATIENT
Start: 2021-04-23 | End: 2021-04-24

## 2021-04-23 RX ORDER — SIMETHICONE 80 MG/1
80 TABLET, CHEWABLE ORAL EVERY 4 HOURS
Refills: 0 | Status: DISCONTINUED | OUTPATIENT
Start: 2021-04-23 | End: 2021-04-26

## 2021-04-23 RX ORDER — IBUPROFEN 200 MG
600 TABLET ORAL EVERY 6 HOURS
Refills: 0 | Status: COMPLETED | OUTPATIENT
Start: 2021-04-23 | End: 2022-03-22

## 2021-04-23 RX ORDER — KETOROLAC TROMETHAMINE 30 MG/ML
15 SYRINGE (ML) INJECTION EVERY 6 HOURS
Refills: 0 | Status: DISCONTINUED | OUTPATIENT
Start: 2021-04-24 | End: 2021-04-24

## 2021-04-23 RX ORDER — OXYTOCIN 10 UNIT/ML
333.33 VIAL (ML) INJECTION
Qty: 20 | Refills: 0 | Status: DISCONTINUED | OUTPATIENT
Start: 2021-04-23 | End: 2021-04-26

## 2021-04-23 RX ORDER — NALOXONE HYDROCHLORIDE 4 MG/.1ML
0.1 SPRAY NASAL
Refills: 0 | Status: DISCONTINUED | OUTPATIENT
Start: 2021-04-24 | End: 2021-04-26

## 2021-04-23 RX ORDER — OXYCODONE HYDROCHLORIDE 5 MG/1
5 TABLET ORAL
Refills: 0 | Status: DISCONTINUED | OUTPATIENT
Start: 2021-04-23 | End: 2021-04-26

## 2021-04-23 RX ORDER — OXYCODONE HYDROCHLORIDE 5 MG/1
5 TABLET ORAL ONCE
Refills: 0 | Status: DISCONTINUED | OUTPATIENT
Start: 2021-04-23 | End: 2021-04-26

## 2021-04-23 RX ORDER — SODIUM CHLORIDE 9 MG/ML
1000 INJECTION, SOLUTION INTRAVENOUS
Refills: 0 | Status: DISCONTINUED | OUTPATIENT
Start: 2021-04-23 | End: 2021-04-26

## 2021-04-23 RX ORDER — ONDANSETRON 8 MG/1
4 TABLET, FILM COATED ORAL EVERY 6 HOURS
Refills: 0 | Status: DISCONTINUED | OUTPATIENT
Start: 2021-04-24 | End: 2021-04-26

## 2021-04-23 RX ORDER — ACETAMINOPHEN 500 MG
1000 TABLET ORAL ONCE
Refills: 0 | Status: DISCONTINUED | OUTPATIENT
Start: 2021-04-23 | End: 2021-04-23

## 2021-04-23 RX ORDER — MAGNESIUM HYDROXIDE 400 MG/1
30 TABLET, CHEWABLE ORAL
Refills: 0 | Status: DISCONTINUED | OUTPATIENT
Start: 2021-04-23 | End: 2021-04-26

## 2021-04-23 RX ORDER — FAMOTIDINE 10 MG/ML
20 INJECTION INTRAVENOUS ONCE
Refills: 0 | Status: COMPLETED | OUTPATIENT
Start: 2021-04-23 | End: 2021-04-23

## 2021-04-23 RX ORDER — DIPHENHYDRAMINE HCL 50 MG
50 CAPSULE ORAL ONCE
Refills: 0 | Status: COMPLETED | OUTPATIENT
Start: 2021-04-23 | End: 2021-04-23

## 2021-04-23 RX ORDER — MAGNESIUM SULFATE 500 MG/ML
2 VIAL (ML) INJECTION
Qty: 40 | Refills: 0 | Status: DISCONTINUED | OUTPATIENT
Start: 2021-04-23 | End: 2021-04-23

## 2021-04-23 RX ORDER — LANOLIN
1 OINTMENT (GRAM) TOPICAL EVERY 6 HOURS
Refills: 0 | Status: DISCONTINUED | OUTPATIENT
Start: 2021-04-23 | End: 2021-04-26

## 2021-04-23 RX ORDER — MAGNESIUM SULFATE 500 MG/ML
2 VIAL (ML) INJECTION
Qty: 40 | Refills: 0 | Status: DISCONTINUED | OUTPATIENT
Start: 2021-04-23 | End: 2021-04-24

## 2021-04-23 RX ORDER — ENOXAPARIN SODIUM 100 MG/ML
70 INJECTION SUBCUTANEOUS EVERY 12 HOURS
Refills: 0 | Status: DISCONTINUED | OUTPATIENT
Start: 2021-04-24 | End: 2021-04-26

## 2021-04-23 RX ORDER — ACETAMINOPHEN 500 MG
1000 TABLET ORAL ONCE
Refills: 0 | Status: DISCONTINUED | OUTPATIENT
Start: 2021-04-24 | End: 2021-04-26

## 2021-04-23 RX ORDER — ACETAMINOPHEN 500 MG
975 TABLET ORAL
Refills: 0 | Status: DISCONTINUED | OUTPATIENT
Start: 2021-04-23 | End: 2021-04-26

## 2021-04-23 RX ORDER — ACETAMINOPHEN 500 MG
975 TABLET ORAL ONCE
Refills: 0 | Status: DISCONTINUED | OUTPATIENT
Start: 2021-04-23 | End: 2021-04-23

## 2021-04-23 RX ORDER — MAGNESIUM SULFATE 500 MG/ML
4 VIAL (ML) INJECTION ONCE
Refills: 0 | Status: COMPLETED | OUTPATIENT
Start: 2021-04-23 | End: 2021-04-23

## 2021-04-23 RX ORDER — TETANUS TOXOID, REDUCED DIPHTHERIA TOXOID AND ACELLULAR PERTUSSIS VACCINE, ADSORBED 5; 2.5; 8; 8; 2.5 [IU]/.5ML; [IU]/.5ML; UG/.5ML; UG/.5ML; UG/.5ML
0.5 SUSPENSION INTRAMUSCULAR ONCE
Refills: 0 | Status: DISCONTINUED | OUTPATIENT
Start: 2021-04-23 | End: 2021-04-26

## 2021-04-23 RX ADMIN — SODIUM CHLORIDE 75 MILLILITER(S): 9 INJECTION, SOLUTION INTRAVENOUS at 19:25

## 2021-04-23 RX ADMIN — SODIUM CHLORIDE 75 MILLILITER(S): 9 INJECTION, SOLUTION INTRAVENOUS at 19:33

## 2021-04-23 RX ADMIN — FAMOTIDINE 20 MILLIGRAM(S): 10 INJECTION INTRAVENOUS at 19:38

## 2021-04-23 RX ADMIN — Medication 50 MILLIGRAM(S): at 01:53

## 2021-04-23 RX ADMIN — SODIUM CHLORIDE 75 MILLILITER(S): 9 INJECTION, SOLUTION INTRAVENOUS at 20:01

## 2021-04-23 RX ADMIN — Medication 50 GM/HR: at 20:01

## 2021-04-23 RX ADMIN — Medication 200 GRAM(S): at 17:08

## 2021-04-23 RX ADMIN — Medication 30 MILLILITER(S): at 19:38

## 2021-04-23 RX ADMIN — Medication 975 MILLIGRAM(S): at 10:21

## 2021-04-23 RX ADMIN — HEPARIN SODIUM 1500 UNIT(S)/HR: 5000 INJECTION INTRAVENOUS; SUBCUTANEOUS at 07:21

## 2021-04-23 RX ADMIN — Medication 975 MILLIGRAM(S): at 11:21

## 2021-04-23 RX ADMIN — Medication 50 GM/HR: at 17:43

## 2021-04-23 NOTE — OB NEONATOLOGY/PEDIATRICIAN DELIVERY SUMMARY - NSPEDSNEONOTESA_OBGYN_ALL_OB_FT
Requested by Dr. Avina to attend this  R C/S  delivery at 33.4 wks GA due to preeclampsia with severe features of a 31y/o  mom .  She had + PNC, is A  positive, HIV negative, HBsAg negative, GBS unknown, Rubella Imn, RPR NR.   Maternal history pertinent for ? dural sinus thrombosis was on Heparin drip, GDM on metformin, marginal cord insertion on MgSO4, Betamethasone complete, CODID-19 negative .  L&D: ROM at time of delivery clear fluids.  Baby born vertex  good cry and tone transferred to warmer, orally suctioned, dried,  stimulated; Resuscitation: routine.  BW:  g.  Apgar score 9/9.   A:  week AGA   maternal COVID-19 negative, father negative.  P: Baby for transition and when stable to NBN.. Requested by Dr. Avina to attend this  R C/S  delivery at 33.4 wks GA due to preeclampsia with severe features of a 29y/o  mom .  She had + PNC, is A  positive, HIV negative, HBsAg negative, GBS unknown, Rubella Imn, RPR NR.   Maternal history pertinent for ? dural sinus thrombosis was on Heparin drip, GDM on metformin, marginal cord insertion on MgSO4, Betamethasone complete, CODID-19 negative .  L&D: ROM at time of delivery clear fluids.  Baby born vertex  cry  transferred to warmer, orally suctioned, dried,  stimulated; needed blow O2 for several seconds, color improved.  BW:2065 g.  Apgar score 8/9.   A: 33.4 week AGA   maternal COVID-19 negative, father negative.  P: Baby to NICU due to prematurity.

## 2021-04-23 NOTE — PROGRESS NOTE ADULT - SUBJECTIVE AND OBJECTIVE BOX
[facility]                                     Neurology at [place]                                 Fox Cruz, & Garett                                  [address]                                        [place], [place], [address]                                             [phone]    CC: headache, possible dural sinus thrombosis  HPI:  The patient is a 30y Female 33weeks/ 1 day pregnant who presented with pre-eclampsia and severe bifrontal headache.  her headache has improved after medication Tylenol with codeine.  She had some photophobia but no nausea or vomiting.  She had numbness in fingers and toes  She had an MRI brain MRA/MRV head done.  There was no acute intraparenchymal pathology, but did suggest possible left sided dural sinus thrombosis vs hypoplasia as well as thickening in multiple sinuses.  Neurology is asked to evaluate.    Interval history: has sensitivity on left side of head, intermittent bifrontal headache responding to medicine.  No photophobia or nausea/vomiting      Review of systems (neurology): (+) headache. no  dizziness. Denies weakness/numbness.  Denies speech/language deficits. Denies diplopia/blurred vision.  Denies confusion    MEDICATIONS  (STANDING):  acetaminophen  IVPB .. 1000 milliGRAM(s) IV Intermittent once  dextrose 40% Gel 15 Gram(s) Oral once  dextrose 5%. 1000 milliLiter(s) (50 mL/Hr) IV Continuous <Continuous>  dextrose 5%. 1000 milliLiter(s) (100 mL/Hr) IV Continuous <Continuous>  dextrose 50% Injectable 25 Gram(s) IV Push once  dextrose 50% Injectable 12.5 Gram(s) IV Push once  dextrose 50% Injectable 25 Gram(s) IV Push once  glucagon  Injectable 1 milliGRAM(s) IntraMuscular once  insulin lispro (ADMELOG) corrective regimen sliding scale   SubCutaneous three times a day before meals  magnesium sulfate  IVPB 4 Gram(s) IV Intermittent once  magnesium sulfate Infusion 2 Gm/Hr (50 mL/Hr) IV Continuous <Continuous>  magnesium sulfate Infusion 2 Gm/Hr (50 mL/Hr) IV Continuous <Continuous>    MEDICATIONS  (PRN):      Vital Signs Last 24 Hrs  T(C): 36.9 (23 Apr 2021 11:51), Max: 37.1 (22 Apr 2021 21:41)  T(F): 98.5 (23 Apr 2021 11:51), Max: 98.8 (22 Apr 2021 21:41)  HR: 74 (23 Apr 2021 11:51) (71 - 79)  BP: 124/85 (23 Apr 2021 11:51) (115/80 - 132/83)  BP(mean): --  RR: 18 (23 Apr 2021 11:51) (17 - 18)  SpO2: 97% (23 Apr 2021 11:51) (97% - 98%)    Detailed Neurologic Exam:    Neck is supple    Mental status: The patient is awake and alert and has normal attention span.  The patient is fully oriented in 3 spheres. The patient is oriented to current events. The patient is able to name objects, follow commands, repeat sentences.    Cranial nerves: Pupils equal and react symmetrically to light. There is no visual field deficit to confrontation. Extraocular motion is full with no nystagmus. There is no ptosis. Facial sensation is intact. Facial musculature is symmetric. Palate elevates symmetrically. Shoulder shrug is normal. Tongue is midline.    Motor: There is normal bulk and tone.  There is no tremor.  Strength is 5/5 in the right arm and leg.   Strength is 5/5 in the left arm and leg.    Sensation: Intact to light touch and pin in 4 extremities    Reflexes: 2+ throughout and plantar responses are flexor.    Cerebellar: There is no dysmetria on finger to nose testing.    Gait : deferred    LABS:                                    10.7   9.08  )-----------( 180      ( 23 Apr 2021 07:05 )             33.3     04-23    137  |  108<H>  |  14.0  ----------------------------<  80  4.2   |  19.0<L>  |  0.47<L>    Ca    8.2<L>      23 Apr 2021 07:05    TPro  5.9<L>  /  Alb  2.9<L>  /  TBili  <0.2<L>  /  DBili  x   /  AST  19  /  ALT  12  /  AlkPhos  109  04-23    LIVER FUNCTIONS - ( 23 Apr 2021 07:05 )  Alb: 2.9 g/dL / Pro: 5.9 g/dL / ALK PHOS: 109 U/L / ALT: 12 U/L / AST: 19 U/L / GGT: x           PTT - ( 23 Apr 2021 07:05 )  PTT:95.6 sec    RADIOLOGY & ADDITIONAL STUDIES (independently reviewed unless otherwise noted):  MRI brain no acute CVA, mass or blood, (+) opacified b/l maxillary sinuses, b/l sphenoid (partial) and thickening of b/l ethmoidal and right frontal sinuses  MRA head no aneurysm, stenosis or AVM  MRV head question of thrombosis vs hypoplasia of left transverse sinus, sigmoid sinus and proximal internal jugular vein.  However it is noted that hypoplasia is favored due to signal void seen on T2 sequences in jugular vein and sigmoid sinus.

## 2021-04-23 NOTE — PROGRESS NOTE ADULT - ASSESSMENT
The patient is a 30y Female who is followed by neurology because of headache possible dural sinus thrombosis    Her headache improves with medication, part in due to sinuses  She no longer has paresthesia  She may have had migraine or sinus headache  If she has neurologic deficits with headache would get STAT head CT given she is anticoagulated    It is difficult to say if this is sinus thrombosis  The use of IV contrast (MRI or CT) would be helpful to distinguish between hypoplasia and thrombosis  At this point she will be treated with heparin drip, presuming this is sinus thrombus until more definitive imaging is obtained, plan to do MRV post delivery to ensure safety of baby.    Thank you for allowing me to participate in the care of your patient    Remi Braxton MD, PhD   [id number]

## 2021-04-23 NOTE — CHART NOTE - NSCHARTNOTEFT_GEN_A_CORE
Attending progress note    MFM recommends proceeding with delivery of this patient due to worsening pre-eclampsia with severe features due to worsening headache. I discussed this with patient, she understands and agrees to proceed with delivery. Patient had signed for sterilization, we discussed if she is sure even if something happens to this baby and she reports that she is 100% sure and desires to proceed with bilateral salpingectomy. We discussed procedure, risks and benefits at length and would like to proceed with  section.   ppalos

## 2021-04-23 NOTE — CHART NOTE - NSCHARTNOTEFT_GEN_A_CORE
MD notified by RN that patient is complaining of 10/10 headache.  Patient seen at bedside - she is fast asleep with her SO on the couch beside her.  Will continue to monitor

## 2021-04-23 NOTE — PROGRESS NOTE ADULT - TIME BILLING
preeclampsia with severe features, headache, 33 weeks
preeclampsia, possible dural sinus thrombosis, headache, therapeutic anticoagulation in pregnancy
preeclampsia with severe features, migraine, suspected cerebral venous thrombus, A2GDM, prior  delivery, IUP at 33 weeks

## 2021-04-23 NOTE — OB RN DELIVERY SUMMARY - NS_CULTURES_OBGYN_ALL_OB
-- Message is from the [facility]--    Reason for Call: The patient would like a call regarding the referral for the MRI.     Caller Information       Type Contact Phone    02/01/2019 09:15 AM Phone (Incoming) Alejandro Feldman (Self) [phone] (W)          Alternative phone number: N/a    Turnaround time given to caller:   \"This message will be sent to [state Provider's name]. The clinical team will fulfill your request as soon as they review your message.\"     No

## 2021-04-23 NOTE — PROGRESS NOTE ADULT - ASSESSMENT
29 y/o  at 33w4d (EDC 21) admitted for preeclampsia with severe features based on elevated blood pressures requiring IV antihypertensive medication administration x1 and +neurological symptoms.   HD#4    1. Pre-eclampsia with severe features   - PIH labs consistent with PCR of 0.3, rest of lab eval WNL, trending  - MgSO4 discontinued. Belief at this time is headache is not related to PEC  - BP's well controlled at this time, will continue to monitor and medicate as needed, no standing medications at this time   - Beta complete   - Neuro symptoms consistent with headache and visual disturbances, work up and intervention below   Plan: Likely early delivery @ 34 weeks or earlier if presenting with worsening features.     2. Neurological Deficits   - face and extremity neuro exam without major abnormalities, tone/ROM/fine movements intact, ocular exam without gross deficits except pain with left ROM on admission   - headache significantly improved now  - MRI/MRA/MRV Head resulted with abnormal vascular and hypoplastic findings, chronic sinusitis changes, cannot rule out sinus thrombus   - Neurology consult: Possible dural sinus thrombosis. Recommend imaging with contrast; however, will refrain in the setting of likely  delivery and possible fetal effects.   Plan: continue heparin drip for presumed sinus thrombus, deferred CT at this time for clinical risks, neurology on board and agree with plan. Trend PT/PTT/INR and titrate Heparin drip as needed.     3. GDM   - formerly on Metformin 500 BID, will discontinue and start on ISS, required 4u total yesterday, fasting today WNL (97)  - FSG 7xdaily   - HgbA1C elevated in outpatient setting   - glucose control + intervention as needed     4. Hx of PPH   - baseline H/H 12.4/36.6  - Hgb 12.5 on admission   - if/when proceeds to delivery, will prepare for PPH, will discuss TXA and hemorrhage risk     5. 33w gestation   - risk of  delivery, currently ACS complete   - continuous FHT/[place] monitoring, will observe for resolution of  contractions, +FFN, not subjectively feeling pain/cramping with contractions  - OB sono at [facility] , EFW 2019g, vertex presentation, grossly normal placental evaluation, 8 BPP, FARHAN 10cm+  - 3rd trimester labs reviewed and WNL (RPR NR, HIV NR, Rubeola Imm, COVID neg), GBS NEGATIVE   31 y/o  at 33w4d (EDC 21) admitted for preeclampsia with severe features based on elevated blood pressures requiring IV antihypertensive medication administration x1 and +neurological symptoms.   HD#4    1. Pre-eclampsia with severe features   - PIH labs consistent with PCR of 0.3, rest of lab eval WNL, trending  - Worsening headache overnight. Given recurrence of headache, will proceed with repeat c/s and bilateral salpingectomy in the setting of severe features.   - Will restart MgSO4 for seizure prophylaxis  - Heparin discontinued. No need for reversal agents at this time given stable clinical picture. Will wait 6-8 hours after last heparin dose before proceeding with repeat C/S per anesthesiology request  - Pre-operative PT/PTT/INR ordered  - BP's well controlled at this time, will continue to monitor and medicate as needed, no standing medications at this time   - Beta complete       2. Neurological Deficits   - face and extremity neuro exam without major abnormalities, tone/ROM/fine movements intact, ocular exam without gross deficits except pain with left ROM on admission   - Worsening headache this AM  - MRI/MRA/MRV Head resulted with abnormal vascular and hypoplastic findings, chronic sinusitis changes, cannot rule out sinus thrombus   - Neurology consult: Possible dural sinus thrombosis. Recommend imaging with contrast; however, will refrain in the setting of likely  delivery and possible fetal effects.   Plan: continue heparin drip for presumed sinus thrombus, deferred CT at this time for clinical risks, neurology on board and agree with plan.       3. GDM   - formerly on Metformin 500 BID, will discontinue and start on ISS, required 4u total yesterday, fasting today WNL (97)  - FSG 7xdaily   - HgbA1C elevated in outpatient setting   - glucose control + intervention as needed     4. Hx of PPH   - baseline H/H 12.4/36.6  - Hgb 12.5 on admission   - if/when proceeds to delivery, will prepare for PPH, will discuss TXA and hemorrhage risk     5. 33w gestation   - risk of  delivery, currently ACS complete   - continuous FHT/[place] monitoring, will observe for resolution of  contractions, +FFN, not subjectively feeling pain/cramping with contractions  - OB sono at [facility] , EFW 2019g, vertex presentation, grossly normal placental evaluation, 8 BPP, FARHAN 10cm+  - 3rd trimester labs reviewed and WNL (RPR NR, HIV NR, Rubeola Imm, COVID neg), GBS NEGATIVE      Plan discussed and patient seen with:  - Dr. Morris ([facility] attending)  - Dr. Ferrell (Maternal fetal medicine)

## 2021-04-23 NOTE — OB RN DELIVERY SUMMARY - NSSELHIDDEN_OBGYN_ALL_OB_FT
[NS_[email or web address]_ALL_OB_FT:[email or web address]],[NS_DeliveryRN_OBGYN_ALL_OB_FT:[id number]] [NS_DeliveryAttending1_OBGYN_ALL_OB_FT:[email or web address]],[NS_DeliveryRN_OBGYN_ALL_OB_FT:[id number]],[NS_DeliveryAssist1_OBGYN_ALL_OB_FT:[id number]=]

## 2021-04-23 NOTE — PROVIDER CONTACT NOTE (OTHER) - SITUATION
made aware of above also made aware of inr of 95.6 no change in heparin infusion. md states a nurse from labor and delivery will be up to perform non stress test
dr Breaux made aware no further instructions noted pt asymptomatic vsss

## 2021-04-23 NOTE — OB PROVIDER DELIVERY SUMMARY - NSPROVIDERDELIVERYNOTE_OBGYN_ALL_OB_FT
Physical Therapy Daily Treatment    Visit Count: 4                                Plan of Care: 11/7/2018 Through: 1/16/2019  Insurance Information: [employer]-$40 copay  G-CODES APPLY  Recheck after 10 visits  BOMN  Referred by: Juan Carlos Lucas MD; Next provider visit (if known/scheduled): unknown  Medical Diagnosis (from order):    Spastic hemiplegia affecting dominant side (CMS/HCC) [G81.10]  - Primary       Muscle spasticity [M62.838]       Treatment Diagnosis: impaired balance with RLE spasticity symptoms with impaired posture, impaired strength, impaired range of motion, impaired muscle length/flexibility, impaired gait, impaired mobility, impaired activity tolerance, impaired balance, increased risk for falls, impaired motor function/performance/coordination, impaired coordination     Date of onset/injury: 6/2/17  Diagnosis Precautions: none  Chart reviewed at time of initial evaluation (relevant co-morbidities, allergies, tests and medications listed): multiple CVAs (8), stents placed near brainstem     SUBJECTIVE   Patient had a scare in regards to her BP with dizziness as well as weakness occurring. 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 She has stents placed there already and it is too high of a risk to go back in as this may cause another CVA. At this time she is to continue with activity and therapy as tolerated.    Current Pain (0-10 scale): 0   Functional Change: continued use of 4ww for all mobility tasks, no change    OBJECTIVE   No objective assessment measurements taken during today's session.     Treatment   Therapeutic Exercise:   Began therapy session with use of Nu step using BLEs for 8 minutes total with 4 minutes on level 6 followed by 1 minute rest break and then 4 more minutes at level 5. Total number of 431 steps.     Exercise 11/9/18 11/14/18  11/20/18 Position/Cues   Seated DF/PF 1 x 10      Manual calf stretch 2 x 30 seconds      Supine DF 1 x 10      Heel slides 1 x 10      SLR 1 x 6      Bridge with ball squeeze 1 x 10      Step ups forward 1 x 10      Seated HS curl   1 x 10 with overpressure     Seated marches  1 x 10 with AAROM     Seated HS pushes through ball on ground  1 x 10     Seated heel slides on ball  1 x 10      Standing alternating marches  1 x 10 ea 1 x 10 ea with AAROM on R    Quantum leg press DL  1 x 10 at 40#     Quantum leg press SL  1 x 10 at 20#     Standing HS curl   1 x 10 AAROM On R                        Comments: none    Neuromuscular Reeducation: (use of fingertip support as needed during all exercises)  Step over hurdles in // bars leading with RLE as well as LLE - 4 x 10ft -HELD  Lateral walking in // bars - 3 x 10ft each direction - HELD  Static stance feet together with head turns and holds in all directions - 2 x 30 seconds - HELD  Static stance feet together on foam - 2 x 30 seconds  Tandem stance with each foot in front - 1 x 30 seconds ea - HELD  Static stance on toes - 1 x 30 seconds  Cone taps in // bars 1 x 15 ea LE  Static stance with single LE on top of 4'' step - 2 x 30 seconds ea  Backwards walking within // bars 3 x length of bars without UE support and small steps  Forward walking within // bars 6 x length of bars without UE support  Step ups onto foam with alternating foot pattern - 1 x 10    Manual Therapy:   Passive stretches to RLE; HS stretch, calf stretch, hip extensor stretch, piriformis stretch, hip ER stretch (ASPEN position), and hip adductor stretch - all 3 x 30 seconds ea    Skilled input: Skilled intervention provided with tactile, verbal, and demonstration cues for all exercises including continued HEP, new exercises for balance as well as strengthening in order to improve form and safety during session as well as at home. Assist required to achieve full ROM as noted above.    Home Program:    Continue with program from previous therapy in March 2018     Writer verbally educated the patient and received verbal consent from the patient on hand placement, positioning of patient, and techniques to be performed today including exercises and manual stretches how they are pertinent to the patient's plan of care.      Suggestions for next session as indicated: progress per plan of care, continue with BLE strengthening and stretches to decrease spasticity and discomfort (tightness), progress balance exercises, updated HEP for balance exercises.    ASSESSMENT   Patient demonstrate mild dizziness that quickly resolves with position changes today sit <> supine. She is able to progress balance exercises with increased difficulty, however required increased rest breaks as she fatigue quicker than she had in the past.     Pain after treatment (patient reported, 0-10 scale): 0, RLE muscle fatigue   Result of above outlined education: Verbalizes understanding and Demonstrates understanding    THERAPY DAILY BILLING   Insurance: [employer] 2. N/A    Evaluation Procedures:  No evaluation codes were used on this date of service    Timed Procedures:  Manual Therapy, 10 minutes  Neuromuscular Re-Education, 28 minutes  Therapeutic Exercise, 12 minutes    Untimed Procedures:  No untimed codes were used on this date of service    Total Treatment Time: 50 minutes    Routine safety standards followed per [facility] system and site policies     G3 now  s/p uncomplicated repeat  and bilateral salpingectomy at 33w4d due to worsening pre-eclampsia with severe features and maternal central vein thrombosis.   Findings: male infant, cephalic presentation, APGARs 8/9, weight 2065g, baby to NICU due to prematurity. Left inferior epigastric artery suture ligated due to spontaneous bleed. Surgisnow placed over hysterotomy and at the site of the left inferior epigastric artery ligation. Dense omental adhesions to peritoneum. Otherwise grossly normal uterus and ovaries bilaterally.

## 2021-04-23 NOTE — OB RN INTRAOPERATIVE NOTE - NSSELHIDDEN_OBGYN_ALL_OB_FT
[NS_DeliveryAttending1_OBGYN_ALL_OB_FT:[email or web address]],[NS_DeliveryRN_OBGYN_ALL_OB_FT:[id number]=]

## 2021-04-23 NOTE — PROGRESS NOTE ADULT - SUBJECTIVE AND OBJECTIVE BOX
31 y/o  at 33w4d (EDC 21) admitted for preeclampsia with severe features based on elevated blood pressures requiring IV antihypertensive medication administration x1 and +neurological symptoms.   HD#4    S:   Reported nuchal pain overnight, now resolved. Denies current headache or parasthenia    Denies any vaginal bleeding, leakage of fluid, and lower abdominal pain/cramping. +FM   Reports that other distal paresthesias are now resolved.   Denies any visual disturbances at this time, including blurry/double/tunnel vision or stars.  Otherwise, denies dizziness, lightheadedness, SOB or fatigue at rest.   Denies fevers, chills, malaise, fatigue, and myalgia.   Denies epigastric or RUQ pain.     Vital Signs Last 24 Hrs  T(C): 36.8 (2021 05:17), Max: 37.1 (2021 21:41)  T(F): 98.3 (2021 05:17), Max: 98.8 (2021 21:41)  HR: 74 (2021 05:17) (71 - 83)  BP: 132/83 (2021 05:17) (115/80 - 132/83)  RR: 18 (2021 05:17) (17 - 18)  SpO2: 98% (2021 05:17) (97% - 98%)    Gen: NAD  Cardio: RRR  Lungs: CTAB   Abdomen: gravid, nontender to palpation  Ext: nontender lower extremities bilaterally                                      10.7   9.08  )-----------( 180      ( 2021 07:05 )             33.3       137  |  108<H>  |  14.0  ----------------------------<  80  4.2   |  19.0<L>  |  0.47<L>    Ca    8.2<L>      2021 07:05    TPro  5.9<L>  /  Alb  2.9<L>  /  TBili  <0.2<L>  /  DBili  x   /  AST  19  /  ALT  12  /  AlkPhos  109     29 y/o  at 33w4d (EDC 21) admitted for preeclampsia with severe features based on elevated blood pressures requiring IV antihypertensive medication administration x1 and +neurological symptoms.   HD#4    S:   Reported nuchal pain overnight, now resolved.   Reporting persistent occipital headache that is radiating to the frontal region.  In addition, describes recurrent of left orbital pain. States that the pain radiates down her left check.  Reports headache is accompanied by episodes of intermittent dizziness.  States medication she received yesterday only minimally alleviated her headache.  Denies any vaginal bleeding, leakage of fluid, and lower abdominal pain/cramping. +FM   Reports that other distal paresthesias are now resolved.   Otherwise, denies dizziness, lightheadedness, SOB or fatigue at rest.   Denies fevers, chills, malaise, fatigue, and myalgia.   Denies epigastric or RUQ pain.     Vital Signs Last 24 Hrs  T(C): 36.8 (2021 05:17), Max: 37.1 (2021 21:41)  T(F): 98.3 (2021 05:17), Max: 98.8 (2021 21:41)  HR: 74 (2021 05:17) (71 - 83)  BP: 132/83 (2021 05:17) (115/80 - 132/83)  RR: 18 (2021 05:17) (17 - 18)  SpO2: 98% (2021 05:17) (97% - 98%)    Gen: NAD  Cardio: RRR  Lungs: CTAB   Abdomen: gravid, nontender to palpation  Ext: nontender lower extremities bilaterally                                      10.7   9.08  )-----------( 180      ( 2021 07:05 )             33.3   04-23    137  |  108<H>  |  14.0  ----------------------------<  80  4.2   |  19.0<L>  |  0.47<L>    Ca    8.2<L>      2021 07:05    TPro  5.9<L>  /  Alb  2.9<L>  /  TBili  <0.2<L>  /  DBili  x   /  AST  19  /  ALT  12  /  AlkPhos  109

## 2021-04-23 NOTE — OB PROVIDER DELIVERY SUMMARY - NSSELHIDDEN_OBGYN_ALL_OB_FT
[NS_DeliveryAttending1_OBGYN_ALL_OB_FT:[email or web address]=],[NS_DeliveryRN_OBGYN_ALL_OB_FT:[id number]=],[NS_DeliveryAssist1_OBGYN_ALL_OB_FT:[id number]=]

## 2021-04-23 NOTE — OB RN DELIVERY SUMMARY - NS_DELIVERYCRNA_OBGYN_ALL_OB_FT
Cisolm Valtrex Counseling: I discussed with the patient the risks of valacyclovir including but not limited to kidney damage, nausea, vomiting and severe allergy.  The patient understands that if the infection seems to be worsening or is not improving, they are to call.

## 2021-04-23 NOTE — CHART NOTE - NSCHARTNOTEFT_GEN_A_CORE
S: Patient reports occipital headache, 4-5/10, that hurts with palpation making it hard to sleep. She reports blurry vision on the bottom of her left eye. She denies chest pain, SOB, RUQ pain, leg pain.     O:   Vital Signs Last 24 Hrs  T(C): 37.1 (2021 21:41), Max: 37.1 (2021 21:41)  T(F): 98.8 (2021 21:41), Max: 98.8 (2021 21:41)  HR: 71 (2021 21:41) (71 - 83)  BP: 130/73 (2021 21:41) (115/80 - 131/78)  RR: 18 (2021 21:41) (16 - 18)  SpO2: 97% (2021 21:41) (97% - 97%)    Gen: NAD  Pulm: CTAB, no w/r  Card: RRR, no G/R/M  Abd: non-tender, gravid  Ext: 1+ edema    A/P: 30 y.o  at 33 weeks 4 days gestation admitted with preeclampsia, s/p magnesium. She was placed on a heparin drip for presumed sinus thrombus.     - pt continued to complain of HA, however only when the area is palpated which resembles musculoskeletal issues  - BPs normotensive  - pt to receive benadryl to help with sleep.   - continue routine antepartum care    GDMA2:  - she received a total of 2 units today  - continue ISS

## 2021-04-23 NOTE — PROGRESS NOTE ADULT - ATTENDING COMMENTS
MFM Attending    30 year-old  at 33 4/7 weeks admitted with preeclampsia with severe features (severe BPs), also has recurrent headaches which are either attributable to preeclampsia vs. possible dural sinus thrombosis. On heparin gtt per neuro for latter suspicion.    Based on worsening severity and features of headache today, further expectant management is not recommended. Proceed with delivery. Hold heparin for OR.     Imaging with contrast can occur postpartum.    KYRA Ferrell

## 2021-04-24 LAB
BASOPHILS # BLD AUTO: 0.03 K/UL — SIGNIFICANT CHANGE UP (ref 0–0.2)
BASOPHILS NFR BLD AUTO: 0.2 % — SIGNIFICANT CHANGE UP (ref 0–2)
EOSINOPHIL # BLD AUTO: 0.07 K/UL — SIGNIFICANT CHANGE UP (ref 0–0.5)
EOSINOPHIL NFR BLD AUTO: 0.4 % — SIGNIFICANT CHANGE UP (ref 0–6)
HCT VFR BLD CALC: 34 % — LOW (ref 34.5–45)
HGB BLD-MCNC: 11.4 G/DL — LOW (ref 11.5–15.5)
IMM GRANULOCYTES NFR BLD AUTO: 1.3 % — SIGNIFICANT CHANGE UP (ref 0–1.5)
LYMPHOCYTES # BLD AUTO: 1.62 K/UL — SIGNIFICANT CHANGE UP (ref 1–3.3)
LYMPHOCYTES # BLD AUTO: 9.5 % — LOW (ref 13–44)
MAGNESIUM SERPL-MCNC: 4.4 MG/DL — HIGH (ref 1.6–2.6)
MAGNESIUM SERPL-MCNC: 5.6 MG/DL — HIGH (ref 1.6–2.6)
MAGNESIUM SERPL-MCNC: 5.7 MG/DL — HIGH (ref 1.6–2.6)
MCHC RBC-ENTMCNC: 30.2 PG — SIGNIFICANT CHANGE UP (ref 27–34)
MCHC RBC-ENTMCNC: 33.5 GM/DL — SIGNIFICANT CHANGE UP (ref 32–36)
MCV RBC AUTO: 90.2 FL — SIGNIFICANT CHANGE UP (ref 80–100)
MONOCYTES # BLD AUTO: 1.2 K/UL — HIGH (ref 0–0.9)
MONOCYTES NFR BLD AUTO: 7 % — SIGNIFICANT CHANGE UP (ref 2–14)
NEUTROPHILS # BLD AUTO: 13.98 K/UL — HIGH (ref 1.8–7.4)
NEUTROPHILS NFR BLD AUTO: 81.6 % — HIGH (ref 43–77)
PLATELET # BLD AUTO: 181 K/UL — SIGNIFICANT CHANGE UP (ref 150–400)
RBC # BLD: 3.77 M/UL — LOW (ref 3.8–5.2)
RBC # FLD: 13 % — SIGNIFICANT CHANGE UP (ref 10.3–14.5)
WBC # BLD: 17.12 K/UL — HIGH (ref 3.8–10.5)
WBC # FLD AUTO: 17.12 K/UL — HIGH (ref 3.8–10.5)

## 2021-04-24 PROCEDURE — 88302 TISSUE EXAM BY PATHOLOGIST: CPT | Mod: 26

## 2021-04-24 PROCEDURE — 99232 SBSQ HOSP IP/OBS MODERATE 35: CPT

## 2021-04-24 PROCEDURE — 88307 TISSUE EXAM BY PATHOLOGIST: CPT | Mod: 26

## 2021-04-24 RX ORDER — MAGNESIUM SULFATE 500 MG/ML
2 VIAL (ML) INJECTION
Qty: 40 | Refills: 0 | Status: DISCONTINUED | OUTPATIENT
Start: 2021-04-24 | End: 2021-04-26

## 2021-04-24 RX ADMIN — Medication 30 MILLIGRAM(S): at 06:56

## 2021-04-24 RX ADMIN — Medication 30 MILLIGRAM(S): at 11:34

## 2021-04-24 RX ADMIN — Medication 975 MILLIGRAM(S): at 20:55

## 2021-04-24 RX ADMIN — ENOXAPARIN SODIUM 70 MILLIGRAM(S): 100 INJECTION SUBCUTANEOUS at 06:43

## 2021-04-24 RX ADMIN — ENOXAPARIN SODIUM 70 MILLIGRAM(S): 100 INJECTION SUBCUTANEOUS at 18:30

## 2021-04-24 RX ADMIN — Medication 1000 MILLIUNIT(S)/MIN: at 00:44

## 2021-04-24 RX ADMIN — SIMETHICONE 80 MILLIGRAM(S): 80 TABLET, CHEWABLE ORAL at 20:55

## 2021-04-24 RX ADMIN — Medication 15 MILLIGRAM(S): at 23:49

## 2021-04-24 RX ADMIN — Medication 30 MILLIGRAM(S): at 11:49

## 2021-04-24 RX ADMIN — Medication 975 MILLIGRAM(S): at 21:34

## 2021-04-24 RX ADMIN — Medication 30 MILLIGRAM(S): at 18:31

## 2021-04-24 RX ADMIN — Medication 30 MILLIGRAM(S): at 06:43

## 2021-04-24 NOTE — PROGRESS NOTE ADULT - SUBJECTIVE AND OBJECTIVE BOX
Name: ADELAIDE HOFFMAN  MRN: [id number]  Date Admitted: 21  Location: [facility] 2E2013 ([facility] 2EST)  Attending: Amairani Morris      Post Partum Note:     ADELAIDE HOFFMAN is a 30y  s/p repeat  section and bilateral salpingectomy POD #1. C/B by PEC wSF on MgSO4.    SUBJECTIVE:  No acute events overnight. Pain is well controlled with PRN pain medication. No ambulation or po intake yet. Olivo in place. Nausea and vomiting overnight. Has no flatus and BM. Patient is having normal lochia which is decreasing.    She is breastfeeding and the baby is latching on.     OBJECTIVE:    Vital Signs Last 24 Hrs  T(C): 36.9 (2021 06:30), Max: 37.1 (2021 05:30)  T(F): 98.4 (2021 06:30), Max: 98.7 (2021 05:30)  HR: 82 (2021 06:30) (59 - 90)  BP: 129/81 (2021 06:30) (110/61 - 147/95)  RR: 18 (2021 06:30) (16 - 24)  SpO2: 95% (2021 06:30) (93% - 100%)    Physical exam:  General: AOx3, NAD.  Heart: RRR. S1S2.  Lungs: CTABL. Good airflow bilaterally.   Abdomen: +BS, Soft, appropriately tender, nondistended, no guarding or rebound tenderness, firm uterine fundus at umbilicus, the incision is clean dry and intact. No erythema or discharge.  Ext: No DVT signs, warm extremities.        LABS:                        11.3   8.12  )-----------( 182      ( 2021 17:40 )             34.5

## 2021-04-24 NOTE — PROGRESS NOTE ADULT - ASSESSMENT
The patient is a 30y Female who is followed by neurology because of headache possible dural sinus thrombosis    Her headache improves with medication,  She no longer has paresthesia  She may have had migraine or sinus headache, or headache associated with pre-eclampsia  If she has neurologic deficits with headache would get STAT head CT given she is anticoagulated    It is difficult to say if this is sinus thrombosis  The use of IV contrast (MRI or CT) would be helpful to distinguish between hypoplasia and thrombosis  At this point she will be treated with lovenox therapeutic dosing, presuming this is sinus thrombus until more definitive imaging is obtained, plan to do MRV post delivery to ensure safety of baby.    Please order MRV head with contrast once she is able to go for MRV      will follow with you    Remi Braxton MD PhD   [phone]

## 2021-04-24 NOTE — PROGRESS NOTE ADULT - SUBJECTIVE AND OBJECTIVE BOX
[facility]                                     [facility]                                 [address]                                  [address]                                        [place], [place], [address]                                             [phone]    CC: headache, possible dural sinus thrombosis  HPI:  The patient is a 30y Female 33weeks/ 1 day pregnant who presented with pre-eclampsia and severe bifrontal headache.  her headache has improved after medication Tylenol with codeine.  She had some photophobia but no nausea or vomiting.  She had numbness in fingers and toes  She had an MRI brain MRA/MRV head done.  There was no acute intraparenchymal pathology, but did suggest possible left sided dural sinus thrombosis vs hypoplasia as well as thickening in multiple sinuses.  Neurology is asked to evaluate.    Interval history: s/p , no more headache post delivery      Review of systems (neurology): No headache. no  dizziness. Denies weakness/numbness.  Denies speech/language deficits. Denies diplopia/blurred vision.  Denies confusion    MEDICATIONS  (STANDING):  acetaminophen   Tablet .. 975 milliGRAM(s) Oral <User Schedule>  acetaminophen  IVPB .. 1000 milliGRAM(s) IV Intermittent once  diphtheria/tetanus/pertussis (acellular) Vaccine (ADAcel) 0.5 milliLiter(s) IntraMuscular once  enoxaparin Injectable 70 milliGRAM(s) SubCutaneous every 12 hours  ibuprofen  Tablet. 600 milliGRAM(s) Oral every 6 hours  ketorolac   Injectable 30 milliGRAM(s) IV Push every 6 hours  ketorolac   Injectable 15 milliGRAM(s) IV Push every 6 hours  lactated ringers. 1000 milliLiter(s) (125 mL/Hr) IV Continuous <Continuous>  magnesium sulfate Infusion 2 Gm/Hr (50 mL/Hr) IV Continuous <Continuous>  magnesium sulfate Infusion 2 Gm/Hr (50 mL/Hr) IV Continuous <Continuous>  oxytocin Infusion 333.333 milliUNIT(s)/Min (1000 mL/Hr) IV Continuous <Continuous>    MEDICATIONS  (PRN):  diphenhydrAMINE 25 milliGRAM(s) Oral every 6 hours PRN Pruritus  diphenhydrAMINE   Injectable 50 milliGRAM(s) IV Push every 4 hours PRN Pruritus  lanolin Ointment 1 Application(s) Topical every 6 hours PRN Sore Nipples  magnesium hydroxide Suspension 30 milliLiter(s) Oral two times a day PRN Constipation  naloxone Injectable 0.1 milliGRAM(s) IV Push every 3 minutes PRN For ANY of the following changes in patient status:  A. RR LESS THAN 10 breaths per minute, B. Oxygen saturation LESS THAN 90%, C. Sedation score of 6  ondansetron Injectable 4 milliGRAM(s) IV Push every 6 hours PRN Nausea  oxyCODONE    IR 5 milliGRAM(s) Oral every 3 hours PRN Moderate to Severe Pain (4-10)  oxyCODONE    IR 5 milliGRAM(s) Oral once PRN Moderate to Severe Pain (4-10)  simethicone 80 milliGRAM(s) Chew every 4 hours PRN Gas      Vital Signs Last 24 Hrs  T(C): 37 (2021 10:30), Max: 37.1 (2021 05:30)  T(F): 98.6 (2021 10:30), Max: 98.7 (2021 05:30)  HR: 80 (2021 12:30) (59 - 90)  BP: 138/82 (2021 12:30) (110/61 - 147/95)  BP(mean): --  RR: 18 (2021 12:30) (16 - 24)  SpO2: 98% (2021 12:30) (93% - 100%)  Detailed Neurologic Exam:    Neck is supple    Mental status: The patient is awake and alert and has normal attention span.  The patient is fully oriented in 3 spheres. The patient is oriented to current events. The patient is able to name objects, follow commands, repeat sentences.    Cranial nerves: Pupils equal and react symmetrically to light. There is no visual field deficit to confrontation. Extraocular motion is full with no nystagmus. There is no ptosis. Facial sensation is intact. Facial musculature is symmetric. Palate elevates symmetrically. Shoulder shrug is normal. Tongue is midline.    Motor: There is normal bulk and tone.  There is no tremor.  Strength is 5/5 in the right arm and leg.   Strength is 5/5 in the left arm and leg.    Sensation: Intact to light touch and pin in 4 extremities    Reflexes: 2+ throughout and plantar responses are flexor.    Cerebellar: There is no dysmetria on finger to nose testing.    Gait : deferred    LABS:                                    11.4   17.12 )-----------( 181      ( 2021 08:43 )             34.0     04-23    138  |  107  |  12.0  ----------------------------<  72  4.2   |  20.0<L>  |  0.43<L>    Ca    8.1<L>      2021 17:40  Mg     5.6     04-24    TPro  6.0<L>  /  Alb  3.2<L>  /  TBili  <0.2<L>  /  DBili  x   /  AST  28  /  ALT  17  /  AlkPhos  111  04-    LIVER FUNCTIONS - ( 2021 17:40 )  Alb: 3.2 g/dL / Pro: 6.0 g/dL / ALK PHOS: 111 U/L / ALT: 17 U/L / AST: 28 U/L / GGT: x           PT/INR - ( 2021 17:40 )   PT: 11.2 sec;   INR: 0.96 ratio         PTT - ( 2021 17:40 )  PTT:25.2 sec    RADIOLOGY & ADDITIONAL STUDIES (independently reviewed unless otherwise noted):  MRI brain no acute CVA, mass or blood, (+) opacified b/l maxillary sinuses, b/l sphenoid (partial) and thickening of b/l ethmoidal and right frontal sinuses  MRA head no aneurysm, stenosis or AVM  MRV head question of thrombosis vs hypoplasia of left transverse sinus, sigmoid sinus and proximal internal jugular vein.  However it is noted that hypoplasia is favored due to signal void seen on T2 sequences in jugular vein and sigmoid sinus.

## 2021-04-25 ENCOUNTER — TRANSCRIPTION ENCOUNTER (OUTPATIENT)
Age: 31
End: 2021-04-25

## 2021-04-25 LAB — MAGNESIUM SERPL-MCNC: 4.2 MG/DL — HIGH (ref 1.6–2.6)

## 2021-04-25 RX ORDER — IBUPROFEN 200 MG
1 TABLET ORAL
Qty: 40 | Refills: 0
Start: 2021-04-25 | End: 2021-05-04

## 2021-04-25 RX ORDER — IBUPROFEN 200 MG
600 TABLET ORAL EVERY 6 HOURS
Refills: 0 | Status: DISCONTINUED | OUTPATIENT
Start: 2021-04-25 | End: 2021-04-26

## 2021-04-25 RX ORDER — ACETAMINOPHEN 500 MG
3 TABLET ORAL
Qty: 120 | Refills: 0
Start: 2021-04-25 | End: 2021-05-04

## 2021-04-25 RX ADMIN — SIMETHICONE 80 MILLIGRAM(S): 80 TABLET, CHEWABLE ORAL at 22:50

## 2021-04-25 RX ADMIN — Medication 975 MILLIGRAM(S): at 09:54

## 2021-04-25 RX ADMIN — Medication 600 MILLIGRAM(S): at 18:19

## 2021-04-25 RX ADMIN — Medication 975 MILLIGRAM(S): at 17:10

## 2021-04-25 RX ADMIN — Medication 975 MILLIGRAM(S): at 22:48

## 2021-04-25 RX ADMIN — Medication 975 MILLIGRAM(S): at 23:48

## 2021-04-25 RX ADMIN — ENOXAPARIN SODIUM 70 MILLIGRAM(S): 100 INJECTION SUBCUTANEOUS at 18:20

## 2021-04-25 RX ADMIN — Medication 600 MILLIGRAM(S): at 12:46

## 2021-04-25 RX ADMIN — Medication 600 MILLIGRAM(S): at 06:18

## 2021-04-25 RX ADMIN — SIMETHICONE 80 MILLIGRAM(S): 80 TABLET, CHEWABLE ORAL at 03:56

## 2021-04-25 RX ADMIN — Medication 600 MILLIGRAM(S): at 13:45

## 2021-04-25 RX ADMIN — Medication 975 MILLIGRAM(S): at 16:11

## 2021-04-25 RX ADMIN — Medication 15 MILLIGRAM(S): at 00:03

## 2021-04-25 RX ADMIN — ENOXAPARIN SODIUM 70 MILLIGRAM(S): 100 INJECTION SUBCUTANEOUS at 06:18

## 2021-04-25 RX ADMIN — Medication 975 MILLIGRAM(S): at 03:57

## 2021-04-25 RX ADMIN — Medication 975 MILLIGRAM(S): at 04:35

## 2021-04-25 RX ADMIN — Medication 975 MILLIGRAM(S): at 10:54

## 2021-04-25 NOTE — DISCHARGE NOTE OB - PATIENT PORTAL LINK FT
You can access the FollowMyHealth Patient Portal offered by [facility] by registering at the following website: [email or web address]. By joining [facility]’s FollowMyHealth portal, you will also be able to view your health information using other applications (apps) compatible with our system.

## 2021-04-25 NOTE — PROGRESS NOTE ADULT - ASSESSMENT
A/P:  31yo  now POD#2 s/p repeat  section + BS complicated by worsening PEC w/ SF s/p magnesium, GDMA2.  -Stable. Post op Hg 11.4  -Voiding, tolerating PO, bowel function nml   -Advance care as tolerated   -Continue routine postpartum/postoperative care and education.  -DVT: Lovenox 70BID + SCDs  -Pt encouraged to increase ambulation and PO intake.  -Declines circumcision.  -Neurology following for sinus thrombus. Due for MRV today.

## 2021-04-25 NOTE — DISCHARGE NOTE OB - MEDICATION SUMMARY - MEDICATIONS TO TAKE
I will START or STAY ON the medications listed below when I get home from the hospital:    acetaminophen 325 mg oral tablet  -- 3 tab(s) by mouth every 6 hours, As Needed -for mild pain MDD:4,000 mg  -- Indication: For Pain control    ibuprofen 600 mg oral tablet  -- 1 tab(s) by mouth every 6 hours, As Needed -for mild pain   -- Indication: For Pain control    Lovenox 40 mg/0.4 mL injectable solution  -- 40 milligram(s) subcutaneously once a day   -- It is very important that you take or use this exactly as directed.  Do not skip doses or discontinue unless directed by your doctor.    -- Indication: For Anticoagulation prophylaxis    Prenatal 1 oral capsule  -- 1 tab(s) by mouth once a day  -- Indication: For Maternal wellness

## 2021-04-25 NOTE — DISCHARGE NOTE OB - ADDITIONAL INSTRUCTIONS
Please call the [facility] office in [place] for a followup appointment within 1 week for a blood pressure check, 2 weeks for a wound check as well. If you headache returns please call your doctor immediately or return to the hospital if it not relieved with Tylenol.

## 2021-04-25 NOTE — DISCHARGE NOTE OB - CARE PROVIDER_API CALL
Provider,   [address] [place], [place] [address]  Phone: [phone]  Fax: [phone]  Follow Up Time: 1-3 days

## 2021-04-25 NOTE — DISCHARGE NOTE OB - PLAN OF CARE
Patient was followed by neurology during admission due to concern for a central venous thrombosis. Postpartum head imaging was negative for thrombosis; however recommendation for Lovenox prophylaxis therapy for 4 weeks postpartum given DVT risks. 1) Please take ibuprofen as needed for pain as prescribed.  2) Nothing in the vagina for 6 weeks (including no sex, no tampons, and no douching).  3) Please call your doctor for a follow up your postpartum appointment in 2 weeks.  4) Please continue taking vitamins postpartum. Take iron for acute blood loss anemia. Take colace for constipation.  5) Please call the officer sooner if you develop a headache that does not improve with pain medications.   6) Please call the office sooner if you have heavy vaginal bleeding, severe abdominal pain, or fever > 100.4F. Blood pressure control and DVT prophylaxis delivery and recovery

## 2021-04-25 NOTE — DISCHARGE NOTE OB - CARE PLAN
Principal Discharge DX:	 delivery delivered  Goal:	delivery and recovery  Assessment and plan of treatment:	1) Please take ibuprofen as needed for pain as prescribed.  2) Nothing in the vagina for 6 weeks (including no sex, no tampons, and no douching).  3) Please call your doctor for a follow up your postpartum appointment in 2 weeks.  4) Please continue taking vitamins postpartum. Take iron for acute blood loss anemia. Take colace for constipation.  5) Please call the officer sooner if you develop a headache that does not improve with pain medications.   6) Please call the office sooner if you have heavy vaginal bleeding, severe abdominal pain, or fever > 100.4F.  Secondary Diagnosis:	Pre-eclampsia affecting childbirth  Goal:	Blood pressure control and DVT prophylaxis  Assessment and plan of treatment:	Patient was followed by neurology during admission due to concern for a central venous thrombosis. Postpartum head imaging was negative for thrombosis; however recommendation for Lovenox prophylaxis therapy for 4 weeks postpartum given DVT risks.

## 2021-04-25 NOTE — PROGRESS NOTE ADULT - SUBJECTIVE AND OBJECTIVE BOX
Patient is a 29yo  now POD#2 s/p repeat  section + BS complicated by worsening PEC w/ SF s/p magnesium, GDMA2.    S:    No acute events overnight.   Pt seen and examined at bedside. Pt doing well.  Has no complaints. Denies headache this AM  Pain well controlled on current regiment.  Pt ambulating, tolerating PO, voiding w/o difficulty, +flatus/-BM.    O:    T(C): 36.9 (21 @ 03:55), Max: 37.1 (21 @ 22:30)  HR: 78 (21 @ 03:55) (78 - 85)  BP: 106/71 (21 @ 03:55) (106/71 - 143/87)  RR: 20 (21 @ 03:55) (18 - 20)  SpO2: 98% (21 @ 03:55) (97% - 98%)  Wt(kg): --    Physical Exam  Gen: NAD, AAOx3  CV: rrr, s1/s2 present  Lung: cTABL  Breast: NT, non-engorged  Abdomen:  soft, NT, ND, BS+  Uterus:  firm below umbilicus  VE:  expectant lochia  Ext:  NT, nonedematous                          11.4   17.12 )-----------( 181      ( 2021 08:43 )             34.0         138  |  107  |  12.0  ----------------------------<  72  4.2   |  20.0<L>  |  0.43<L>    Ca    8.1<L>      2021 17:40  Mg     4.2         TPro  6.0<L>  /  Alb  3.2<L>  /  TBili  <0.2<L>  /  DBili  x   /  AST  28  /  ALT  17  /  AlkPhos  111

## 2021-04-25 NOTE — PROGRESS NOTE ADULT - ATTENDING COMMENTS
post  day # 2  precl s/p mag  sinus thrombosis vs hyerplasia  needs MRA/V wigh contrast as recommended by neuro  other routine ob pp care

## 2021-04-25 NOTE — DISCHARGE NOTE OB - PROVIDER TOKENS
FREE:[LAST:[Provider],PHONE:[phone]],FAX:[phone]],ADDRESS:[[address] [place] [address]],FOLLOWUP:[1-3 days]]

## 2021-04-25 NOTE — DISCHARGE NOTE OB - HOSPITAL COURSE
Patient admitted for pre-eclampsia with severe features during . Patient has a repeat  delivery at 33 weeks and required magnesium sulfate therapy for seizure prophylaxis. Blood pressures remain controlled without the need of oral antihypertensive therapy. Patient was followed by neurology during this admission due to a severe headache and concern for a central venous thrombus. Repeat postpartum head imaging was negative for thrombosis, however recommendation was made to continue with prophylactic Lovenox for 4 weeks. Her pain was well controlled. She is tolerating a regular diet. She is ambulating independently. She was voiding without assistance. Patient with flatus. Labs and Vitals WNL at time of discharge.

## 2021-04-26 VITALS
RESPIRATION RATE: 18 BRPM | OXYGEN SATURATION: 97 % | TEMPERATURE: 98 F | DIASTOLIC BLOOD PRESSURE: 80 MMHG | SYSTOLIC BLOOD PRESSURE: 116 MMHG | HEART RATE: 93 BPM

## 2021-04-26 PROCEDURE — 70545 MR ANGIOGRAPHY HEAD W/DYE: CPT | Mod: 26

## 2021-04-26 PROCEDURE — 99233 SBSQ HOSP IP/OBS HIGH 50: CPT

## 2021-04-26 RX ORDER — ENOXAPARIN SODIUM 100 MG/ML
40 INJECTION SUBCUTANEOUS
Qty: 1200 | Refills: 0
Start: 2021-04-26 | End: 2021-05-25

## 2021-04-26 RX ADMIN — ENOXAPARIN SODIUM 70 MILLIGRAM(S): 100 INJECTION SUBCUTANEOUS at 05:26

## 2021-04-26 RX ADMIN — Medication 600 MILLIGRAM(S): at 05:26

## 2021-04-26 RX ADMIN — Medication 975 MILLIGRAM(S): at 09:37

## 2021-04-26 RX ADMIN — Medication 975 MILLIGRAM(S): at 10:10

## 2021-04-26 RX ADMIN — SIMETHICONE 80 MILLIGRAM(S): 80 TABLET, CHEWABLE ORAL at 09:37

## 2021-04-26 RX ADMIN — Medication 600 MILLIGRAM(S): at 06:24

## 2021-04-26 NOTE — PROGRESS NOTE ADULT - NSICDXPILOT_GEN_ALL_CORE
[place]

## 2021-04-26 NOTE — PROGRESS NOTE ADULT - SUBJECTIVE AND OBJECTIVE BOX
[facility]                                        [facility] [place]                                 [address]                                  [address]                                        [place], [place], [address]                                             [phone]        CC: headache     HPI:   The patient is a 30y Female 33weeks/ 1 day pregnant who presented with pre-eclampsia and severe bifrontal headache. Her headache has improved after medication Tylenol with codeine.  She had some photophobia but no nausea or vomiting.  She had numbness in fingers and toes  She had an MRI brain MRA/MRV head done.  There was no acute intraparenchymal pathology, but did suggest possible left sided dural sinus thrombosis vs hypoplasia as well as thickening in multiple sinuses.     Interim history:  Remains on mother-baby unit.     ROS:   Denies headache or dizziness.  Denies chest pain.  Denies shortness of breath.    MEDICATIONS  (STANDING):  acetaminophen   Tablet .. 975 milliGRAM(s) Oral <User Schedule>  acetaminophen  IVPB .. 1000 milliGRAM(s) IV Intermittent once  diphtheria/tetanus/pertussis (acellular) Vaccine (ADAcel) 0.5 milliLiter(s) IntraMuscular once  enoxaparin Injectable 70 milliGRAM(s) SubCutaneous every 12 hours  ibuprofen  Tablet. 600 milliGRAM(s) Oral every 6 hours  lactated ringers. 1000 milliLiter(s) (125 mL/Hr) IV Continuous <Continuous>  magnesium sulfate Infusion 2 Gm/Hr (50 mL/Hr) IV Continuous <Continuous>  magnesium sulfate Infusion 2 Gm/Hr (50 mL/Hr) IV Continuous <Continuous>  oxytocin Infusion 333.333 milliUNIT(s)/Min (1000 mL/Hr) IV Continuous <Continuous>      Vital Signs Last 24 Hrs  T(C): 36.9 (26 Apr 2021 05:25), Max: 36.9 (25 Apr 2021 15:22)  T(F): 98.5 (26 Apr 2021 05:25), Max: 98.5 (26 Apr 2021 05:25)  HR: 85 (26 Apr 2021 05:25) (81 - 85)  BP: 122/86 (26 Apr 2021 05:25) (110/74 - 122/86)  BP(mean): --  RR: 17 (26 Apr 2021 05:25) (17 - 18)  SpO2: 98% (26 Apr 2021 05:25) (98% - 98%)    Detailed Neurologic Exam:    Mental status: The patient is awake and alert. There is no aphasia. There is no dysarthria.     Cranial nerves: Pupils equal and react symmetrically to light. There is no visual field deficit to threat. Extraocular motion is full with no nystagmus. Facial sensation is intact. Facial musculature is symmetric. Palate elevates symmetrically. Tongue is midline.    Motor: There is normal bulk and tone.  There is no tremor.  Strength grossly 5/5 bilaterally.    Sensation: Grossly intact to light touch and pin.    Reflexes: 2+ throughout and plantar responses are flexor.    Cerebellar: No dysmetria on finger nose testing.    Labs:       Mg     4.2     04-25      Rad:   Contrast MRV images reviewed.   No sign of sinus thrombosis.

## 2021-04-26 NOTE — PROGRESS NOTE ADULT - ASSESSMENT
30y Female who is followed by neurology because of headache possible dural sinus thrombosis    Her headache improves with medication,  She no longer has paresthesia  She may have had migraine or sinus headache, or headache associated with pre-eclampsia  MRV with contrast negative for sinus thrombosis.   OK for discharge from neurologic standpoint.     Can follow up in office if headache returns.     Case discussed with Obstetric team (house staff under Dr Morris).

## 2021-04-26 NOTE — PROGRESS NOTE ADULT - ATTENDING COMMENTS
POD3  complicated by Pre eclampsia w severe features  pt asymptomatic now, not on any antihypertensive   BP wnl   denies headache, blurry vision   using breast pump   baby in NICU  exam abd soft, ND, NT, wound healing well, steristrip in place  ext no edema, non tender  plan   MRV w contrast today   f/up with neurology   continue lovenox   if cleared by neurology then discharge home and follow up in [facility] in one week POD3  complicated by Pre eclampsia w severe features  pt asymptomatic now, not on any antihypertensive   BP wnl   denies headache, blurry vision   using breast pump   baby in NICU  exam abd soft, ND, NT, wound healing well, steristrip in place  ext no edema, non tender  plan   MRV w contrast today   f/up with neurology   continue lovenox   if cleared by neurology then discharge home and follow up in [facility] in one week  [place]    attending addendum  Neg MRV f/u in the office in onwe week  ppalos

## 2021-04-26 NOTE — PROGRESS NOTE ADULT - ASSESSMENT
A/P:  29yo  now POD#3 s/p repeat  section + BS complicated by worsening PEC w/ SF s/p magnesium, GDMA2.  -Stable. Post op Hg 11.4  -Voiding, tolerating PO, bowel function nml   -Advance care as tolerated   -Continue routine postpartum/postoperative care and education.  -DVT: Lovenox 70BID + SCDs  -Pt encouraged to increase ambulation and PO intake.  -Declines circumcision.  -Neurology following for sinus thrombus. Due for MRV today.

## 2021-04-26 NOTE — PROGRESS NOTE ADULT - SUBJECTIVE AND OBJECTIVE BOX
Patient is a 31yo  now POD#3 s/p repeat  section + BS complicated by worsening PEC w/ SF s/p magnesium, GDMA2.    S:    No acute events overnight.   Pt seen and examined at bedside. Pt doing well.  Has no complaints. Denies headache this AM  Pain well controlled on current regiment.  Pt ambulating, tolerating PO, voiding w/o difficulty, +flatus/-BM.    O:    Vital Signs Last 24 Hrs  T(C): 36.9 (2021 05:25), Max: 37 (2021 10:00)  T(F): 98.5 (2021 05:25), Max: 98.6 (2021 10:00)  HR: 85 (2021 05:25) (81 - 97)  BP: 122/86 (2021 05:25) (107/70 - 122/86)  RR: 17 (2021 05:25) (17 - 18)  SpO2: 98% (2021 05:25) (96% - 98%)    Physical Exam  Gen: NAD, AAOx3  CV: rrr, s1/s2 present  Lung: cTABL  Breast: NT, non-engorged  Abdomen:  soft, NT, ND, BS+  Uterus:  firm below umbilicus  VE:  expectant lochia  Ext:  NT, nonedematous                          11.4   17.12 )-----------( 181      ( 2021 08:43 )             34.0     -    138  |  107  |  12.0  ----------------------------<  72  4.2   |  20.0<L>  |  0.43<L>    Ca    8.1<L>      2021 17:40  Mg     4.2         TPro  6.0<L>  /  Alb  3.2<L>  /  TBili  <0.2<L>  /  DBili  x   /  AST  28  /  ALT  17  /  AlkPhos  111

## 2021-04-27 ENCOUNTER — NON-APPOINTMENT (OUTPATIENT)
Age: 31
End: 2021-04-27

## 2021-04-28 ENCOUNTER — APPOINTMENT (OUTPATIENT)
Dept: MATERNAL FETAL MEDICINE | Facility: CLINIC | Age: 31
End: 2021-04-28

## 2021-04-29 ENCOUNTER — NON-APPOINTMENT (OUTPATIENT)
Age: 31
End: 2021-04-29

## 2021-04-29 LAB — SURGICAL PATHOLOGY STUDY: SIGNIFICANT CHANGE UP

## 2021-05-04 NOTE — OB RN PATIENT PROFILE - NS PRO PT RIGHT SUPPORT PERSON
Pt participated in Therapeutic Recreation group with focus on stress reduction, leisure education, and acquisition of knowledge and skills. Pt was fully engaged and cooperative in group recreational intervention; leisure inventory. Pt was focused on the written portion for the first part of group and then contributed to the group discussion following. Pt discussed several recreational interests and positive coping skills that are healthy outlets, specifically having a connection with a higher power.   same name as above

## 2021-05-05 ENCOUNTER — NON-APPOINTMENT (OUTPATIENT)
Age: 31
End: 2021-05-05

## 2021-05-13 ENCOUNTER — NON-APPOINTMENT (OUTPATIENT)
Age: 31
End: 2021-05-13

## 2021-05-17 ENCOUNTER — APPOINTMENT (OUTPATIENT)
Dept: NEUROLOGY | Facility: CLINIC | Age: 31
End: 2021-05-17
Payer: MEDICAID

## 2021-05-17 VITALS
DIASTOLIC BLOOD PRESSURE: 80 MMHG | HEIGHT: 63 IN | BODY MASS INDEX: 25.87 KG/M2 | WEIGHT: 146 LBS | SYSTOLIC BLOOD PRESSURE: 120 MMHG | TEMPERATURE: 98.3 F

## 2021-05-17 DIAGNOSIS — G44.89 OTHER HEADACHE SYNDROME: ICD-10-CM

## 2021-05-17 PROCEDURE — 99214 OFFICE O/P EST MOD 30 MIN: CPT

## 2021-05-17 PROCEDURE — T1013: CPT

## 2021-05-17 PROCEDURE — 99072 ADDL SUPL MATRL&STAF TM PHE: CPT

## 2021-05-20 ENCOUNTER — NON-APPOINTMENT (OUTPATIENT)
Age: 31
End: 2021-05-20

## 2021-05-26 ENCOUNTER — NON-APPOINTMENT (OUTPATIENT)
Age: 31
End: 2021-05-26

## 2021-06-22 PROCEDURE — 84156 ASSAY OF PROTEIN URINE: CPT

## 2021-06-22 PROCEDURE — 70544 MR ANGIOGRAPHY HEAD W/O DYE: CPT

## 2021-06-22 PROCEDURE — 82962 GLUCOSE BLOOD TEST: CPT

## 2021-06-22 PROCEDURE — G0463: CPT

## 2021-06-22 PROCEDURE — 86780 TREPONEMA PALLIDUM: CPT

## 2021-06-22 PROCEDURE — 82570 ASSAY OF URINE CREATININE: CPT

## 2021-06-22 PROCEDURE — 83735 ASSAY OF MAGNESIUM: CPT

## 2021-06-22 PROCEDURE — 86769 SARS-COV-2 COVID-19 ANTIBODY: CPT

## 2021-06-22 PROCEDURE — 70545 MR ANGIOGRAPHY HEAD W/DYE: CPT

## 2021-06-22 PROCEDURE — 80053 COMPREHEN METABOLIC PANEL: CPT

## 2021-06-22 PROCEDURE — 76805 OB US >/= 14 WKS SNGL FETUS: CPT

## 2021-06-22 PROCEDURE — 86765 RUBEOLA ANTIBODY: CPT

## 2021-06-22 PROCEDURE — 81001 URINALYSIS AUTO W/SCOPE: CPT

## 2021-06-22 PROCEDURE — 82731 ASSAY OF FETAL FIBRONECTIN: CPT

## 2021-06-22 PROCEDURE — 59025 FETAL NON-STRESS TEST: CPT

## 2021-06-22 PROCEDURE — 85025 COMPLETE CBC W/AUTO DIFF WBC: CPT

## 2021-06-22 PROCEDURE — 88307 TISSUE EXAM BY PATHOLOGIST: CPT

## 2021-06-22 PROCEDURE — 76819 FETAL BIOPHYS PROFIL W/O NST: CPT

## 2021-06-22 PROCEDURE — 85384 FIBRINOGEN ACTIVITY: CPT

## 2021-06-22 PROCEDURE — 36415 COLL VENOUS BLD VENIPUNCTURE: CPT

## 2021-06-22 PROCEDURE — 93975 VASCULAR STUDY: CPT

## 2021-06-22 PROCEDURE — 85027 COMPLETE CBC AUTOMATED: CPT

## 2021-06-22 PROCEDURE — 76815 OB US LIMITED FETUS(S): CPT

## 2021-06-22 PROCEDURE — 87389 HIV-1 AG W/HIV-1&-2 AB AG IA: CPT

## 2021-06-22 PROCEDURE — 86901 BLOOD TYPING SEROLOGIC RH(D): CPT

## 2021-06-22 PROCEDURE — 59050 FETAL MONITOR W/REPORT: CPT

## 2021-06-22 PROCEDURE — 83615 LACTATE (LD) (LDH) ENZYME: CPT

## 2021-06-22 PROCEDURE — 93005 ELECTROCARDIOGRAM TRACING: CPT

## 2021-06-22 PROCEDURE — 85610 PROTHROMBIN TIME: CPT

## 2021-06-22 PROCEDURE — 70551 MRI BRAIN STEM W/O DYE: CPT

## 2021-06-22 PROCEDURE — 86850 RBC ANTIBODY SCREEN: CPT

## 2021-06-22 PROCEDURE — 85730 THROMBOPLASTIN TIME PARTIAL: CPT

## 2021-06-22 PROCEDURE — 87081 CULTURE SCREEN ONLY: CPT

## 2021-06-22 PROCEDURE — 88302 TISSUE EXAM BY PATHOLOGIST: CPT

## 2021-06-22 PROCEDURE — 86900 BLOOD TYPING SEROLOGIC ABO: CPT

## 2021-06-22 PROCEDURE — 84550 ASSAY OF BLOOD/URIC ACID: CPT

## 2021-06-22 PROCEDURE — 86703 HIV-1/HIV-2 1 RESULT ANTBDY: CPT

## 2021-06-22 PROCEDURE — C1889: CPT

## 2021-11-30 NOTE — OB NEONATOLOGY/PEDIATRICIAN DELIVERY SUMMARY - NSNURSERYA_OBGYN_ALL_OB
Ongoing SW/CM Assessment/Plan of Care Note     See SW/CM flowsheets for goals and other objective data.    Patient/Family discharge goal (s):             PT Recommendation:          OT Recommendation:          SLP Recommendation:       Disposition:  Planned Discharge Destination: Home/apartment with Spouse/SO    Progress note:   Reviewed the pt's medical chart.  Met with the pt, Dr. Kaba, and RN to discuss the pt's plan of care. Pt will discharge home with  when medically stable. IMM provided and discussed.   The pt denied any needs/concerns at this time or anticipated at the time of discharge.  Continue to follow for discharge planning.           NICU/Special Care Nursery

## 2025-05-05 NOTE — PATIENT PROFILE ADULT - NSPROPTRIGHTCAREGIVER_GEN_A_NUR
[address]  [facility] [address]  Dept: [phone]    PATIENT NAME: Thania Taylor  PATIENT MRN: [id number]  PRIMARY CARE PHYSICIAN: Juan Rizo PA-C    HPI:      Thania Taylor is a 37 y.o. female who presents to clinic today for evaluation of migraine and syncope. Other medical history is significant for HTN, HLD, bipolar 2, MTHFR positive, prediabetes.     Presents today for mobility evaluation for consideration of electric power chair for in and out of the home. She had been evaluated by [facility] neurology (tele-visit) regarding dysautonomia in March 2025. Neuro cardio autonomic reflex with tilt table has been ordered by them.     Receives Ajovy for headache prevention, Ubrelvy 50 mg for rescue.    She reports 3-5 episodes per day manifesting as either lightheadedness or complete syncope. \"The more I pass out, the more I seize up after and my whole body hurts.\" She has a sensation of \"body locked and I can't respond.\" No tongue-biting, incontinence, focal weakness. She cannot ambulate short distances and arrives to today's appointment in wheelchair. At most, she feels she can take 10 steps without passing out, but on bad days this can be less. She cannot bend forward. She has a manual wheelchair and with pushing herself, she becomes more lightheaded.     Self-propelling the wheelchair also worsens her left cubital tunnel symptom- left ulnar transposition next Monday.     MRI brain w wo March 2025: Normal MRI of the brain.     She has been referred to rheum, allergist neha pending.     Ubrelvy is effective for rescue, often re-doses the 50 mg.     Prior inforamation:     Reports since December 2, 2024 she has been experiencing near syncope and syncope which are mainly provoked by standing/ walking. Rarely she has noted that if she has been reclined and sits up, she will pass out. She had never had these symptoms before recently, except for during pregnancy related to preeclampsia. She 
no

## 2025-07-14 NOTE — OB RN DELIVERY SUMMARY - NS_FETALMONITOR_OBGYN_ALL_OB
Medication failed protocol.    Medication:  metFORMIN (GLUCOPHAGE-XR) 500 MG 24 hr tablet   Medication Refill Protocol Failed.  Failed criteria:  Medication (including dose and sig) on current meds list . Sent to clinician to review.   [id number] [address]  [address] [place] [facility] [address]  Phone: [phone]  Fax: [phone]   Last office visit date: 03/04/25  Next appointment scheduled?: Yes     
External [place]/External [facility]